# Patient Record
Sex: FEMALE | Race: BLACK OR AFRICAN AMERICAN | Employment: OTHER | URBAN - METROPOLITAN AREA
[De-identification: names, ages, dates, MRNs, and addresses within clinical notes are randomized per-mention and may not be internally consistent; named-entity substitution may affect disease eponyms.]

---

## 2020-01-15 ENCOUNTER — HOSPITAL ENCOUNTER (EMERGENCY)
Age: 80
Discharge: HOME | End: 2020-01-15
Attending: EMERGENCY MEDICINE
Payer: MEDICARE

## 2020-01-15 VITALS
TEMPERATURE: 98 F | HEART RATE: 66 BPM | RESPIRATION RATE: 13 BRPM | OXYGEN SATURATION: 98 % | SYSTOLIC BLOOD PRESSURE: 189 MMHG | HEIGHT: 64 IN | BODY MASS INDEX: 26.84 KG/M2 | WEIGHT: 157.19 LBS | DIASTOLIC BLOOD PRESSURE: 71 MMHG

## 2020-01-15 DIAGNOSIS — M19.011 PRIMARY OSTEOARTHRITIS OF RIGHT SHOULDER: Primary | ICD-10-CM

## 2020-01-15 PROCEDURE — 99283 EMERGENCY DEPT VISIT LOW MDM: CPT

## 2020-01-15 PROCEDURE — 93005 ELECTROCARDIOGRAM TRACING: CPT

## 2020-01-15 PROCEDURE — 74011636637 HC RX REV CODE- 636/637: Performed by: EMERGENCY MEDICINE

## 2020-01-15 PROCEDURE — 74011250636 HC RX REV CODE- 250/636: Performed by: EMERGENCY MEDICINE

## 2020-01-15 PROCEDURE — 96372 THER/PROPH/DIAG INJ SC/IM: CPT

## 2020-01-15 RX ORDER — KETOROLAC TROMETHAMINE 30 MG/ML
15 INJECTION, SOLUTION INTRAMUSCULAR; INTRAVENOUS ONCE
Status: COMPLETED | OUTPATIENT
Start: 2020-01-15 | End: 2020-01-15

## 2020-01-15 RX ORDER — PREDNISONE 20 MG/1
20 TABLET ORAL DAILY
Qty: 7 TAB | Refills: 0 | Status: SHIPPED | OUTPATIENT
Start: 2020-01-15 | End: 2020-01-22

## 2020-01-15 RX ORDER — PREDNISONE 20 MG/1
40 TABLET ORAL
Status: COMPLETED | OUTPATIENT
Start: 2020-01-15 | End: 2020-01-15

## 2020-01-15 RX ORDER — NAPROXEN 500 MG/1
500 TABLET ORAL 2 TIMES DAILY WITH MEALS
Qty: 20 TAB | Refills: 0 | Status: SHIPPED | OUTPATIENT
Start: 2020-01-15 | End: 2020-01-25

## 2020-01-15 RX ADMIN — KETOROLAC TROMETHAMINE 15 MG: 30 INJECTION, SOLUTION INTRAMUSCULAR at 16:47

## 2020-01-15 RX ADMIN — PREDNISONE 40 MG: 20 TABLET ORAL at 16:46

## 2020-01-15 NOTE — ED PROVIDER NOTES
EMERGENCY DEPARTMENT HISTORY AND PHYSICAL EXAM      Date: 1/15/2020  Patient Name: Zane Maldonado  Patient Age and Sex: 78 y.o. female    History of Presenting Illness     Chief Complaint   Patient presents with    Arm Pain     Ambulatory into the ED with c/o Rt sided neck/shoulder pain, worse with movement. Pain x 3 months. Also c/o decreased ROM to RUE. Pt also reports having to wear \"a heart monitor\" due to recent tachycardia. History Provided By: Patient    Ability to gather history was limited by: None    HPI: Zane Maldonado, 78 y.o. female complains of approximately 3 months of pain in the right shoulder and right neck, aching and cramping in nature, up to 8 out of 10 severity, and worsened by range of motion of the right shoulder and turning the neck. No falls or injuries. No numbness or weakness. No headaches. Reports she saw an orthopedist for the same symptoms about 3 months ago, was advised to start a course of physical therapy and have corticosteroid injection. She reports that she has been unable to get the steroid injection secondary to insurance issues, and unable to go to physical therapy secondary to financial issues with the co-pay and missing work. She has not taken any medications for the pain, as she states she cannot take ibuprofen or Naprosyn. Pt denies any other alleviating or exacerbating factors. There are no other complaints, changes or physical findings at this time.      Past Medical History:   Diagnosis Date    Asthma     Diabetes [facility])      Past Surgical History:   Procedure Laterality Date    BREAST SURGERY PROCEDURE UNLISTED      lumpectomy    HX HEENT      bilateral cataracts removed    HX OTHER SURGICAL      hemoroidectomy    UPPER GI ENDOSCOPY,BIOPSY  12/21/2016         UPPER GI ENDOSCOPY,REMV TUMOR,SNARE  12/21/2016            PCP: Art Renae MD    Past History     Past Medical History:  Past Medical History:   Diagnosis Date    Asthma     Diabetes [facility])        Past Surgical History:  Past Surgical History:   Procedure Laterality Date    BREAST SURGERY PROCEDURE UNLISTED      lumpectomy    HX HEENT      bilateral cataracts removed    HX OTHER SURGICAL      hemoroidectomy    UPPER GI ENDOSCOPY,BIOPSY  12/21/2016         UPPER GI ENDOSCOPY,REMV TUMOR,SNARE  12/21/2016            Family History:  No family history on file. Social History:  Social History     Tobacco Use    Smoking status: Never Smoker   Substance Use Topics    Alcohol use: Yes     Comment: occasional    Drug use: No       Allergies:  No Known Allergies    Current Medications:  No current facility-administered medications on file prior to encounter. Current Outpatient Medications on File Prior to Encounter   Medication Sig Dispense Refill    esomeprazole (NEXIUM) 40 mg capsule Take 40 mg by mouth daily.  traMADol (ULTRAM) 50 mg tablet Take 1 Tab by mouth every six (6) hours as needed for Pain. Max Daily Amount: 200 mg. 20 Tab 0    atenolol (TENORMIN) 25 mg tablet Take 25 mg by mouth daily.  glimepiride (AMARYL) 1 mg tablet Take 1 mg by mouth Daily (before breakfast).  ASPIRIN (ASPIR-81 PO) Take 81 mg by mouth.  lisinopril (PRINIVIL, ZESTRIL) 10 mg tablet Take  by mouth daily. Review of Systems   Review of Systems   Constitutional: Negative for fever. Musculoskeletal: Positive for neck pain and neck stiffness. Negative for gait problem. Neurological: Negative for headaches. All other systems reviewed and are negative. Physical Exam   Vital Signs  Patient Vitals for the past 24 hrs:   Temp Pulse Resp BP SpO2   01/15/20 1558 98 °F (36.7 °C) 66 13 189/71 98 %       Physical Exam  Vitals signs and nursing note reviewed. Constitutional:       General: She is not in acute distress. Appearance: She is well-developed. HENT:      Head: Normocephalic and atraumatic.       Mouth/Throat:      Mouth: Mucous membranes are moist.   Eyes: General:         Right eye: No discharge. Left eye: No discharge. Conjunctiva/sclera: Conjunctivae normal.   Neck:      Musculoskeletal: Normal range of motion and neck supple. Normal range of motion. Muscular tenderness present. No injury or spinous process tenderness. Cardiovascular:      Rate and Rhythm: Normal rate and regular rhythm. Heart sounds: Normal heart sounds. No murmur. Pulmonary:      Effort: Pulmonary effort is normal. No respiratory distress. Breath sounds: Normal breath sounds. No wheezing. Abdominal:      General: There is no distension. Palpations: Abdomen is soft. Tenderness: There is no tenderness. Musculoskeletal:         General: No deformity. Right shoulder: She exhibits decreased range of motion and tenderness. She exhibits no swelling and no deformity. Skin:     General: Skin is warm and dry. Findings: No rash. Neurological:      Mental Status: She is alert and oriented to person, place, and time. Psychiatric:         Behavior: Behavior normal.         Thought Content: Thought content normal.         Diagnostic Study Results   Labs  No results found for this or any previous visit (from the past 24 hour(s)). Radiologic Studies  No orders to display     CT Results  (Last 48 hours)    None        CXR Results  (Last 48 hours)    None          Procedures   Procedures    Medical Decision Making     Provider Notes (Medical Decision Making):   42-year-old female with chronic musculoskeletal type pain in the right shoulder and right neck, which was previously evaluated by an orthopedist in October 2019 and deemed to be uncomplicated osteoarthritis. Patient has been unable to obtain the recommended course of therapy that included steroid injection and physical therapy. Her examination today is reassuring. She seems mildly uncomfortable, especially with range of motion of the right shoulder. There are no signs of septic arthritis. No erythema or induration. She has uncomplicated muscular tenderness in the right shoulder and right trapezius area. No significant concern for spinal cord pathology, septic arthritis, or fracture. There is no indication for repeat x-ray or CT imaging today, given her recent x-rays demonstrating osteoarthritis. Give a course of oral prednisone, will also give a course of 10 days of naproxen. As far as I can tell should be safe for her to take a short course of naproxen. She will follow-up with the orthopedist for steroid injections, follow-up with primary care physician to help arrange physical therapy. Paddy Wade MD  4:40 PM        Consult required? No      Medications Administered During ED Course:  Medications   ketorolac (TORADOL) injection 15 mg (has no administration in time range)   predniSONE (DELTASONE) tablet 40 mg (has no administration in time range)          Diagnosis and Disposition     Disposition:  Discharged    Clinical Impression:   1. Primary osteoarthritis of right shoulder        Attestation:  I personally performed the services described in this documentation on this date 1/15/2020 for patient Rizwana Acosta. Paddy Wade MD        I was the first provider for this patient on this visit. To the best of my ability I reviewed relevant prior medical records, electrocardiograms, laboratories, and radiologic studies. The patient's presenting problems were discussed, and the patient was in agreement with the care plan formulated and outlined with them. Paddy Wade MD    Please note that this dictation was completed with Dragon voice recognition software. Quite often unanticipated grammatical, syntax, homophones, and other interpretive errors are inadvertently transcribed by the computer software. Please disregard these errors and excuse any errors that have escaped final proofreading.

## 2020-01-15 NOTE — ED NOTES
Pt discharged by MD. Pt provided with discharge instructions Rx and instructions on follow up care. Pt out of ED ambulatory without difficulty accompanied by family.

## 2020-01-15 NOTE — ED NOTES
Pt reports from home with family bedside. Pt has worsening 8/10  arm/neck pain on the right side for several months. Pt reports that she feels grinding sensation in affected area that is reducing her ROM. Pt is monitored X2.

## 2020-01-15 NOTE — DISCHARGE INSTRUCTIONS
Take prednisone 20 mg daily for 1 week. Also take naproxen 500 mg twice daily for 10 days. Follow-up with Dr. Nimisha Peterson, orthopedist, for steroid injections, and arrange physical therapy through your primary care physician.

## 2020-01-16 LAB
ATRIAL RATE: 67 BPM
CALCULATED P AXIS, ECG09: 31 DEGREES
CALCULATED R AXIS, ECG10: -12 DEGREES
CALCULATED T AXIS, ECG11: 16 DEGREES
DIAGNOSIS, 93000: NORMAL
P-R INTERVAL, ECG05: 126 MS
Q-T INTERVAL, ECG07: 396 MS
QRS DURATION, ECG06: 86 MS
QTC CALCULATION (BEZET), ECG08: 418 MS
VENTRICULAR RATE, ECG03: 67 BPM

## 2020-09-22 ENCOUNTER — APPOINTMENT (OUTPATIENT)
Dept: GENERAL RADIOLOGY | Age: 80
DRG: 287 | End: 2020-09-22
Attending: EMERGENCY MEDICINE
Payer: MEDICARE

## 2020-09-22 ENCOUNTER — HOSPITAL ENCOUNTER (INPATIENT)
Age: 80
LOS: 3 days | Discharge: HOME HEALTH | DRG: 287 | End: 2020-09-25
Attending: EMERGENCY MEDICINE | Admitting: STUDENT IN AN ORGANIZED HEALTH CARE EDUCATION/TRAINING PROGRAM
Payer: MEDICARE

## 2020-09-22 ENCOUNTER — APPOINTMENT (OUTPATIENT)
Dept: CT IMAGING | Age: 80
DRG: 287 | End: 2020-09-22
Attending: EMERGENCY MEDICINE
Payer: MEDICARE

## 2020-09-22 DIAGNOSIS — R91.1 LUNG NODULE: ICD-10-CM

## 2020-09-22 DIAGNOSIS — R77.8 TROPONIN LEVEL ELEVATED: ICD-10-CM

## 2020-09-22 DIAGNOSIS — I50.9 ACUTE CONGESTIVE HEART FAILURE, UNSPECIFIED HEART FAILURE TYPE (HCC): ICD-10-CM

## 2020-09-22 DIAGNOSIS — E87.70 HYPERVOLEMIA, UNSPECIFIED HYPERVOLEMIA TYPE: Primary | ICD-10-CM

## 2020-09-22 PROBLEM — D61.818 PANCYTOPENIA (HCC): Status: ACTIVE | Noted: 2020-09-22

## 2020-09-22 PROBLEM — C90.00 MULTIPLE MYELOMA (HCC): Status: ACTIVE | Noted: 2020-09-22

## 2020-09-22 PROBLEM — E11.9 TYPE II DIABETES MELLITUS (HCC): Status: ACTIVE | Noted: 2020-09-22

## 2020-09-22 LAB
ALBUMIN SERPL-MCNC: 3.2 G/DL (ref 3.5–5)
ALBUMIN/GLOB SERPL: 1.2 {RATIO} (ref 1.1–2.2)
ALP SERPL-CCNC: 84 U/L (ref 45–117)
ALT SERPL-CCNC: 23 U/L (ref 12–78)
ANION GAP SERPL CALC-SCNC: 5 MMOL/L (ref 5–15)
AST SERPL-CCNC: 22 U/L (ref 15–37)
ATRIAL RATE: 103 BPM
BASOPHILS # BLD: 0 K/UL (ref 0–0.1)
BASOPHILS NFR BLD: 0 % (ref 0–1)
BILIRUB SERPL-MCNC: 0.8 MG/DL (ref 0.2–1)
BNP SERPL-MCNC: 2108 PG/ML
BUN SERPL-MCNC: 9 MG/DL (ref 6–20)
BUN/CREAT SERPL: 11 (ref 12–20)
CALCIUM SERPL-MCNC: 7.9 MG/DL (ref 8.5–10.1)
CALCULATED P AXIS, ECG09: 41 DEGREES
CALCULATED R AXIS, ECG10: -17 DEGREES
CALCULATED T AXIS, ECG11: -178 DEGREES
CHLORIDE SERPL-SCNC: 112 MMOL/L (ref 97–108)
CK SERPL-CCNC: 50 U/L (ref 26–192)
CO2 SERPL-SCNC: 28 MMOL/L (ref 21–32)
CREAT SERPL-MCNC: 0.8 MG/DL (ref 0.55–1.02)
DIAGNOSIS, 93000: NORMAL
DIFFERENTIAL METHOD BLD: ABNORMAL
EOSINOPHIL # BLD: 0 K/UL (ref 0–0.4)
EOSINOPHIL NFR BLD: 1 % (ref 0–7)
ERYTHROCYTE [DISTWIDTH] IN BLOOD BY AUTOMATED COUNT: 19.1 % (ref 11.5–14.5)
GLOBULIN SER CALC-MCNC: 2.7 G/DL (ref 2–4)
GLUCOSE BLD STRIP.AUTO-MCNC: 118 MG/DL (ref 65–100)
GLUCOSE BLD STRIP.AUTO-MCNC: 85 MG/DL (ref 65–100)
GLUCOSE SERPL-MCNC: 145 MG/DL (ref 65–100)
HCT VFR BLD AUTO: 31.5 % (ref 35–47)
HGB BLD-MCNC: 10.3 G/DL (ref 11.5–16)
IMM GRANULOCYTES # BLD AUTO: 0 K/UL (ref 0–0.04)
IMM GRANULOCYTES NFR BLD AUTO: 0 % (ref 0–0.5)
LYMPHOCYTES # BLD: 0.6 K/UL (ref 0.8–3.5)
LYMPHOCYTES NFR BLD: 18 % (ref 12–49)
MCH RBC QN AUTO: 26.1 PG (ref 26–34)
MCHC RBC AUTO-ENTMCNC: 32.7 G/DL (ref 30–36.5)
MCV RBC AUTO: 79.7 FL (ref 80–99)
MONOCYTES # BLD: 0.7 K/UL (ref 0–1)
MONOCYTES NFR BLD: 21 % (ref 5–13)
NEUTS SEG # BLD: 2.2 K/UL (ref 1.8–8)
NEUTS SEG NFR BLD: 60 % (ref 32–75)
NRBC # BLD: 0 K/UL (ref 0–0.01)
NRBC BLD-RTO: 0 PER 100 WBC
P-R INTERVAL, ECG05: 128 MS
PLATELET # BLD AUTO: 77 K/UL (ref 150–400)
PMV BLD AUTO: ABNORMAL FL (ref 8.9–12.9)
POTASSIUM SERPL-SCNC: 3.6 MMOL/L (ref 3.5–5.1)
PROT SERPL-MCNC: 5.9 G/DL (ref 6.4–8.2)
Q-T INTERVAL, ECG07: 344 MS
QRS DURATION, ECG06: 84 MS
QTC CALCULATION (BEZET), ECG08: 450 MS
RBC # BLD AUTO: 3.95 M/UL (ref 3.8–5.2)
RBC MORPH BLD: ABNORMAL
SERVICE CMNT-IMP: ABNORMAL
SERVICE CMNT-IMP: NORMAL
SODIUM SERPL-SCNC: 145 MMOL/L (ref 136–145)
TROPONIN I SERPL-MCNC: 0.11 NG/ML
TROPONIN I SERPL-MCNC: 0.12 NG/ML
TROPONIN I SERPL-MCNC: 0.13 NG/ML
TSH SERPL DL<=0.05 MIU/L-ACNC: 0.97 UIU/ML (ref 0.36–3.74)
VENTRICULAR RATE, ECG03: 103 BPM
WBC # BLD AUTO: 3.5 K/UL (ref 3.6–11)

## 2020-09-22 PROCEDURE — 85025 COMPLETE CBC W/AUTO DIFF WBC: CPT

## 2020-09-22 PROCEDURE — 71275 CT ANGIOGRAPHY CHEST: CPT

## 2020-09-22 PROCEDURE — 80053 COMPREHEN METABOLIC PANEL: CPT

## 2020-09-22 PROCEDURE — 93005 ELECTROCARDIOGRAM TRACING: CPT

## 2020-09-22 PROCEDURE — 65660000000 HC RM CCU STEPDOWN

## 2020-09-22 PROCEDURE — 94761 N-INVAS EAR/PLS OXIMETRY MLT: CPT

## 2020-09-22 PROCEDURE — 71045 X-RAY EXAM CHEST 1 VIEW: CPT

## 2020-09-22 PROCEDURE — 74011250636 HC RX REV CODE- 250/636: Performed by: STUDENT IN AN ORGANIZED HEALTH CARE EDUCATION/TRAINING PROGRAM

## 2020-09-22 PROCEDURE — 83880 ASSAY OF NATRIURETIC PEPTIDE: CPT

## 2020-09-22 PROCEDURE — 74011250637 HC RX REV CODE- 250/637: Performed by: EMERGENCY MEDICINE

## 2020-09-22 PROCEDURE — 84484 ASSAY OF TROPONIN QUANT: CPT

## 2020-09-22 PROCEDURE — 36415 COLL VENOUS BLD VENIPUNCTURE: CPT

## 2020-09-22 PROCEDURE — 74011250637 HC RX REV CODE- 250/637: Performed by: STUDENT IN AN ORGANIZED HEALTH CARE EDUCATION/TRAINING PROGRAM

## 2020-09-22 PROCEDURE — 74011000636 HC RX REV CODE- 636: Performed by: EMERGENCY MEDICINE

## 2020-09-22 PROCEDURE — 74011250636 HC RX REV CODE- 250/636: Performed by: EMERGENCY MEDICINE

## 2020-09-22 PROCEDURE — 99284 EMERGENCY DEPT VISIT MOD MDM: CPT

## 2020-09-22 PROCEDURE — 96374 THER/PROPH/DIAG INJ IV PUSH: CPT

## 2020-09-22 PROCEDURE — 82550 ASSAY OF CK (CPK): CPT

## 2020-09-22 PROCEDURE — 82962 GLUCOSE BLOOD TEST: CPT

## 2020-09-22 PROCEDURE — 84443 ASSAY THYROID STIM HORMONE: CPT

## 2020-09-22 RX ORDER — CARVEDILOL 12.5 MG/1
12.5 TABLET ORAL 2 TIMES DAILY WITH MEALS
Status: DISCONTINUED | OUTPATIENT
Start: 2020-09-22 | End: 2020-09-22

## 2020-09-22 RX ORDER — ACETAMINOPHEN 325 MG/1
650 TABLET ORAL
Status: DISCONTINUED | OUTPATIENT
Start: 2020-09-22 | End: 2020-09-25 | Stop reason: HOSPADM

## 2020-09-22 RX ORDER — LABETALOL HYDROCHLORIDE 5 MG/ML
20 INJECTION, SOLUTION INTRAVENOUS
Status: DISCONTINUED | OUTPATIENT
Start: 2020-09-22 | End: 2020-09-25 | Stop reason: HOSPADM

## 2020-09-22 RX ORDER — DEXAMETHASONE 4 MG/1
40 TABLET ORAL
COMMUNITY

## 2020-09-22 RX ORDER — MAGNESIUM SULFATE 100 %
4 CRYSTALS MISCELLANEOUS AS NEEDED
Status: DISCONTINUED | OUTPATIENT
Start: 2020-09-22 | End: 2020-09-25 | Stop reason: HOSPADM

## 2020-09-22 RX ORDER — FUROSEMIDE 10 MG/ML
40 INJECTION INTRAMUSCULAR; INTRAVENOUS 2 TIMES DAILY
Status: DISCONTINUED | OUTPATIENT
Start: 2020-09-22 | End: 2020-09-25

## 2020-09-22 RX ORDER — SODIUM CHLORIDE 0.9 % (FLUSH) 0.9 %
10 SYRINGE (ML) INJECTION
Status: COMPLETED | OUTPATIENT
Start: 2020-09-22 | End: 2020-09-22

## 2020-09-22 RX ORDER — ASPIRIN 325 MG
325 TABLET ORAL
Status: COMPLETED | OUTPATIENT
Start: 2020-09-22 | End: 2020-09-22

## 2020-09-22 RX ORDER — DOCUSATE SODIUM 100 MG/1
100 CAPSULE, LIQUID FILLED ORAL AS NEEDED
Status: DISCONTINUED | OUTPATIENT
Start: 2020-09-22 | End: 2020-09-25 | Stop reason: HOSPADM

## 2020-09-22 RX ORDER — INSULIN LISPRO 100 [IU]/ML
INJECTION, SOLUTION INTRAVENOUS; SUBCUTANEOUS
Status: DISCONTINUED | OUTPATIENT
Start: 2020-09-22 | End: 2020-09-25 | Stop reason: HOSPADM

## 2020-09-22 RX ORDER — ACYCLOVIR 800 MG/1
400 TABLET ORAL 2 TIMES DAILY
Status: DISCONTINUED | OUTPATIENT
Start: 2020-09-22 | End: 2020-09-23

## 2020-09-22 RX ORDER — DEXTROSE 50 % IN WATER (D50W) INTRAVENOUS SYRINGE
12.5-25 AS NEEDED
Status: DISCONTINUED | OUTPATIENT
Start: 2020-09-22 | End: 2020-09-25 | Stop reason: HOSPADM

## 2020-09-22 RX ORDER — ENOXAPARIN SODIUM 100 MG/ML
40 INJECTION SUBCUTANEOUS EVERY 24 HOURS
Status: DISCONTINUED | OUTPATIENT
Start: 2020-09-22 | End: 2020-09-25 | Stop reason: HOSPADM

## 2020-09-22 RX ORDER — CARVEDILOL 12.5 MG/1
25 TABLET ORAL 2 TIMES DAILY WITH MEALS
Status: DISCONTINUED | OUTPATIENT
Start: 2020-09-22 | End: 2020-09-25 | Stop reason: HOSPADM

## 2020-09-22 RX ORDER — ACYCLOVIR 400 MG/1
400 TABLET ORAL 2 TIMES DAILY
COMMUNITY

## 2020-09-22 RX ORDER — ONDANSETRON 2 MG/ML
4 INJECTION INTRAMUSCULAR; INTRAVENOUS
Status: DISCONTINUED | OUTPATIENT
Start: 2020-09-22 | End: 2020-09-25 | Stop reason: HOSPADM

## 2020-09-22 RX ORDER — CYANOCOBALAMIN (VITAMIN B-12) 1000 MCG
1 TABLET, EXTENDED RELEASE ORAL DAILY
COMMUNITY
End: 2020-09-25

## 2020-09-22 RX ORDER — CARVEDILOL 12.5 MG/1
12.5 TABLET ORAL 2 TIMES DAILY WITH MEALS
COMMUNITY
End: 2020-09-25

## 2020-09-22 RX ORDER — FUROSEMIDE 10 MG/ML
20 INJECTION INTRAMUSCULAR; INTRAVENOUS
Status: COMPLETED | OUTPATIENT
Start: 2020-09-22 | End: 2020-09-22

## 2020-09-22 RX ORDER — LANOLIN ALCOHOL/MO/W.PET/CERES
1 CREAM (GRAM) TOPICAL
Status: DISCONTINUED | OUTPATIENT
Start: 2020-09-23 | End: 2020-09-25 | Stop reason: HOSPADM

## 2020-09-22 RX ADMIN — ACYCLOVIR 400 MG: 800 TABLET ORAL at 18:07

## 2020-09-22 RX ADMIN — CARVEDILOL 25 MG: 12.5 TABLET, FILM COATED ORAL at 18:07

## 2020-09-22 RX ADMIN — ACYCLOVIR 400 MG: 800 TABLET ORAL at 12:10

## 2020-09-22 RX ADMIN — FUROSEMIDE 20 MG: 10 INJECTION, SOLUTION INTRAMUSCULAR; INTRAVENOUS at 08:38

## 2020-09-22 RX ADMIN — IOPAMIDOL 57 ML: 755 INJECTION, SOLUTION INTRAVENOUS at 09:58

## 2020-09-22 RX ADMIN — FUROSEMIDE 40 MG: 10 INJECTION, SOLUTION INTRAMUSCULAR; INTRAVENOUS at 18:07

## 2020-09-22 RX ADMIN — ENOXAPARIN SODIUM 40 MG: 40 INJECTION SUBCUTANEOUS at 12:10

## 2020-09-22 RX ADMIN — Medication 10 ML: at 09:58

## 2020-09-22 RX ADMIN — ASPIRIN 325 MG: 325 TABLET, FILM COATED ORAL at 09:41

## 2020-09-22 RX ADMIN — CARVEDILOL 25 MG: 12.5 TABLET, FILM COATED ORAL at 12:10

## 2020-09-22 NOTE — ED PROVIDER NOTES
EMERGENCY DEPARTMENT HISTORY AND PHYSICAL EXAM      Date: 9/22/2020  Patient Name: Lisandro Ramsay    History of Presenting Illness     Chief Complaint   Patient presents with    Shortness of Breath       History Provided By: Patient    HPI: Lisandro Ramsay, 78 y.o. female presents to the ED with cc of shortness of breath. Patient reports intermittent shortness of breath on and off for \"some time. \"  Patient states that she thinks she has a history of asthma. Today, she presents emergency department due to shortness of breath that is more severe. Patient reports she is winded with ambulation and at rest.  Patient states she has been unable to get comfortable at night. She reports orthopnea. Denies cough or hemoptysis. Denies fevers or chills. Denies any sick contacts. Denies new chest pain, although did have herpes zoster has some residual pain from that. He is undergoing chemotherapy, last chemotherapy 1 week ago. Does report increased lower extremity swelling bilaterally since starting chemotherapy. No history of CHF. No history of DVT or PE. There are no other complaints, changes, or physical findings at this time. PCP: Soto Jules MD    No current facility-administered medications on file prior to encounter. Current Outpatient Medications on File Prior to Encounter   Medication Sig Dispense Refill    carvediloL (COREG) 12.5 mg tablet Take 12.5 mg by mouth two (2) times daily (with meals).  dexAMETHasone (DECADRON) 4 mg tablet Take 40 mg by mouth every seven (7) days. Takes before chemo for MM      acyclovir (ZOVIRAX) 400 mg tablet Take 400 mg by mouth two (2) times a day.  iron, carbonyl (Feosol) 45 mg tab Take 1 Tab by mouth daily.  glimepiride (AMARYL) 1 mg tablet Take 1 mg by mouth Daily (before breakfast).  [DISCONTINUED] esomeprazole (NEXIUM) 40 mg capsule Take 40 mg by mouth daily.       [DISCONTINUED] traMADol (ULTRAM) 50 mg tablet Take 1 Tab by mouth every six (6) hours as needed for Pain. Max Daily Amount: 200 mg. 20 Tab 0    ASPIRIN (ASPIR-81 PO) Take 81 mg by mouth.  [DISCONTINUED] atenolol (TENORMIN) 25 mg tablet Take 25 mg by mouth daily.  [DISCONTINUED] lisinopril (PRINIVIL, ZESTRIL) 10 mg tablet Take  by mouth daily. Past History     Past Medical History:  Past Medical History:   Diagnosis Date    Asthma     Coronary artery disease     s/p stent    Diabetes ([facility] [address]     20+ years    Hypertension     Multiple myeloma ([facility] [address] 2020       Past Surgical History:  Past Surgical History:   Procedure Laterality Date    BREAST SURGERY PROCEDURE UNLISTED      lumpectomy    HX HEENT      bilateral cataracts removed    HX OTHER SURGICAL      hemoroidectomy    UPPER GI ENDOSCOPY,BIOPSY  12/21/2016         UPPER GI ENDOSCOPY,REMV TUMOR,SNARE  12/21/2016            Family History:  History reviewed. No pertinent family history. Social History:  Social History     Tobacco Use    Smoking status: Never Smoker    Smokeless tobacco: Never Used   Substance Use Topics    Alcohol use: Not Currently     Comment: occasional    Drug use: No       Allergies:  No Known Allergies      Review of Systems   Review of Systems   Constitutional: Negative for activity change, chills and fever. HENT: Negative for facial swelling and voice change. Eyes: Negative for redness. Respiratory: Positive for shortness of breath. Negative for cough and wheezing. Cardiovascular: Positive for chest pain and leg swelling. Gastrointestinal: Negative for abdominal pain, diarrhea, nausea and vomiting. Genitourinary: Negative for decreased urine volume. Musculoskeletal: Negative for gait problem. Skin: Negative for pallor and rash. Neurological: Negative for tremors and facial asymmetry. Psychiatric/Behavioral: Negative for agitation. All other systems reviewed and are negative. Physical Exam   Physical Exam  Vitals signs and nursing note reviewed. Constitutional:       Comments: 78-year-old female, becomes dyspneic with ambulation to bed from wheelchair. HENT:      Head: Normocephalic and atraumatic. Neck:      Musculoskeletal: Normal range of motion. Cardiovascular:      Rate and Rhythm: Regular rhythm. Tachycardia present. Heart sounds: No murmur. No friction rub. No gallop. Pulmonary:      Effort: Tachypnea present. Breath sounds: Normal breath sounds. Chest:      Chest wall: Tenderness present. Abdominal:      Palpations: Abdomen is soft. Tenderness: There is no abdominal tenderness. Musculoskeletal: Normal range of motion. Right lower leg: Edema present. Left lower leg: Edema present. Comments: 3+ pitting edema bilaterally. Skin:     General: Skin is warm. Capillary Refill: Capillary refill takes less than 2 seconds. Neurological:      General: No focal deficit present. Mental Status: She is alert. Psychiatric:         Mood and Affect: Mood normal.         Diagnostic Study Results     Labs -     Recent Results (from the past 12 hour(s))   EKG, 12 LEAD, INITIAL    Collection Time: 09/22/20  7:36 AM   Result Value Ref Range    Ventricular Rate 103 BPM    Atrial Rate 103 BPM    P-R Interval 128 ms    QRS Duration 84 ms    Q-T Interval 344 ms    QTC Calculation (Bezet) 450 ms    Calculated P Axis 41 degrees    Calculated R Axis -17 degrees    Calculated T Axis -178 degrees    Diagnosis       Sinus tachycardia with premature supraventricular complexes  Minimal voltage criteria for LVH, may be normal variant  ST & T wave abnormality, consider lateral ischemia  When compared with ECG of 15-ROBERT-2020 16:02,  premature supraventricular complexes are now present  Vent.  rate has increased BY  36 BPM  ST now depressed in Lateral leads  Nonspecific T wave abnormality, worse in Inferior leads  T wave inversion now evident in Lateral leads     CBC WITH AUTOMATED DIFF    Collection Time: 09/22/20  8:31 AM Result Value Ref Range    WBC 3.5 (L) 3.6 - 11.0 K/uL    RBC 3.95 3.80 - 5.20 M/uL    HGB 10.3 (L) 11.5 - 16.0 g/dL    HCT 31.5 (L) 35.0 - 47.0 %    MCV 79.7 (L) 80.0 - 99.0 FL    MCH 26.1 26.0 - 34.0 PG    MCHC 32.7 30.0 - 36.5 g/dL    RDW 19.1 (H) 11.5 - 14.5 %    PLATELET 77 (L) 599 - 400 K/uL    MPV ABNORMAL 8.9 - 12.9 FL    NRBC 0.0 0  WBC    ABSOLUTE NRBC 0.00 0.00 - 0.01 K/uL    NEUTROPHILS 60 32 - 75 %    LYMPHOCYTES 18 12 - 49 %    MONOCYTES 21 (H) 5 - 13 %    EOSINOPHILS 1 0 - 7 %    BASOPHILS 0 0 - 1 %    IMMATURE GRANULOCYTES 0 0.0 - 0.5 %    ABS. NEUTROPHILS 2.2 1.8 - 8.0 K/UL    ABS. LYMPHOCYTES 0.6 (L) 0.8 - 3.5 K/UL    ABS. MONOCYTES 0.7 0.0 - 1.0 K/UL    ABS. EOSINOPHILS 0.0 0.0 - 0.4 K/UL    ABS. BASOPHILS 0.0 0.0 - 0.1 K/UL    ABS. IMM. GRANS. 0.0 0.00 - 0.04 K/UL    DF SMEAR SCANNED      RBC COMMENTS ANISOCYTOSIS  1+       METABOLIC PANEL, COMPREHENSIVE    Collection Time: 09/22/20  8:31 AM   Result Value Ref Range    Sodium 145 136 - 145 mmol/L    Potassium 3.6 3.5 - 5.1 mmol/L    Chloride 112 (H) 97 - 108 mmol/L    CO2 28 21 - 32 mmol/L    Anion gap 5 5 - 15 mmol/L    Glucose 145 (H) 65 - 100 mg/dL    BUN 9 6 - 20 MG/DL    Creatinine 0.80 0.55 - 1.02 MG/DL    BUN/Creatinine ratio 11 (L) 12 - 20      GFR est AA >60 >60 ml/min/1.73m2    GFR est non-AA >60 >60 ml/min/1.73m2    Calcium 7.9 (L) 8.5 - 10.1 MG/DL    Bilirubin, total 0.8 0.2 - 1.0 MG/DL    ALT (SGPT) 23 12 - 78 U/L    AST (SGOT) 22 15 - 37 U/L    Alk.  phosphatase 84 45 - 117 U/L    Protein, total 5.9 (L) 6.4 - 8.2 g/dL    Albumin 3.2 (L) 3.5 - 5.0 g/dL    Globulin 2.7 2.0 - 4.0 g/dL    A-G Ratio 1.2 1.1 - 2.2     CK W/ REFLX CKMB    Collection Time: 09/22/20  8:31 AM   Result Value Ref Range    CK 50 26 - 192 U/L   TROPONIN I    Collection Time: 09/22/20  8:31 AM   Result Value Ref Range    Troponin-I, Qt. 0.11 (H) <0.05 ng/mL   NT-PRO BNP    Collection Time: 09/22/20  8:31 AM   Result Value Ref Range    NT pro-BNP 2,108 (H) <450 PG/ML   TROPONIN I    Collection Time: 09/22/20 10:36 AM   Result Value Ref Range    Troponin-I, Qt. 0.13 (H) <0.05 ng/mL   TSH 3RD GENERATION    Collection Time: 09/22/20 10:36 AM   Result Value Ref Range    TSH 0.97 0.36 - 3.74 uIU/mL       Radiologic Studies -   CTA CHEST W OR W WO CONT   Final Result   IMPRESSION:      1. No evidence for pulmonary emboli. 2. Bilateral pleural effusions and pulmonary edema are noted. 3. 2 x 1.6 cm focal consolidation anteriorly in the right upper lobe could be   foci of pneumonia either bacterial or viral. This could be neoplastic process. Clinical correlation close follow-up is suggested. This is part solid in   appearance. Follow-up CT in 3 months is suggested. 4. Fairly extensive changes of multiple myeloma most pronounced in the manubrium   but there are several lytic areas as described. XR CHEST PORT   Final Result   Impression: Pulmonary edema. CT Results  (Last 48 hours)               09/22/20 0958  CTA CHEST W OR W WO CONT Final result    Impression:  IMPRESSION:       1. No evidence for pulmonary emboli. 2. Bilateral pleural effusions and pulmonary edema are noted. 3. 2 x 1.6 cm focal consolidation anteriorly in the right upper lobe could be   foci of pneumonia either bacterial or viral. This could be neoplastic process. Clinical correlation close follow-up is suggested. This is part solid in   appearance. Follow-up CT in 3 months is suggested. 4. Fairly extensive changes of multiple myeloma most pronounced in the manubrium   but there are several lytic areas as described. Narrative:  INDICATION: History of melanoma, shortness of breath, evaluate for pulmonary   embolism       COMPARISON: 9/22/2020 chest radiograph       CONTRAST: 57 mL of Isovue-370. TECHNIQUE:  A precontrast localizer was utilized to determine the level of the   pulmonary arteries.  2.5 mm axial thin sections were obtained following the rapid bolus administration of 57 cc Isovue-370. Images were reformatted in the   sagittal and coronal plane. 3D post processing was performed including MIP   imaging. CT dose reduction was achieved through use of a standardized protocol   tailored for this examination and automatic exposure control for dose   modulation. FINDINGS:       THYROID: No nodule. MEDIASTINUM: No mass or lymphadenopathy. SHAUNA: No mass or lymphadenopathy. THORACIC AORTA: No aneurysm. MAIN PULMONARY ARTERY: Normal in caliber. There is excellent opacification of   the pulmonary arteries. No evidence for pulmonary emboli. TRACHEA/BRONCHI: Patent. ESOPHAGUS: No wall thickening or dilatation. HEART: Normal in size. PLEURA: There are moderate bilateral pleural effusions. These layer   dependently. .   The lungs demonstrate mild bilateral lower lobe areas of atelectasis due to the   effusions. There is hazy diffuse groundglass opacities with mild thickening of   the interlobular septa consistent pulmonary edema. Anteriorly in the right upper lobe best seen on image 93 is a 2.5 a 1.6 cm area   of apparent airspace consolidation. This could represent focal pneumonia either   bacterial or viral. Neoplastic process is not excluded. Clinical correlation and   close follow-up is suggested. Follow-up CT in 3 months is suggested. No other   such areas of apparent consolidation are noted. The central airways are patent       INCIDENTALLY IMAGED UPPER ABDOMEN: No focal abnormality. BONES: The bones are diffusely abnormal. There is a large lytic lesion involving   the entirety of the manubrium with some erosion of the cortex both anteriorly   and posteriorly. There are also lucent areas in the sternum. There are several   lytic mildly expansile rib lesions with associated fractures that appears   subacute these findings are consistent with multiple myeloma. No compression   fractures of the thoracic spine are noted. .       There are postsurgical changes in the left breast.               CXR Results  (Last 48 hours)               09/22/20 0801  XR CHEST PORT Final result    Impression:  Impression: Pulmonary edema. Narrative: Indication: Shortness of breath       Comparison: None       Portable exam of the chest obtained at 759 demonstrates mild cardiomegaly. Pulmonary edema is noted. Degenerative changes are seen in the glenohumeral   joints with bilateral chronic rotator cuff tears. Degenerative changes are also   noted in the thoracic spine. Medical Decision Making   I am the first provider for this patient. I reviewed the vital signs, available nursing notes, past medical history, past surgical history, family history and social history. Vital Signs-Reviewed the patient's vital signs. Patient Vitals for the past 12 hrs:   Temp Pulse Resp BP SpO2   09/22/20 1730  81 20 (!) 153/72 95 %   09/22/20 1700  80 22 (!) 162/68 94 %   09/22/20 1630  74 19 (!) 147/104 93 %   09/22/20 1600  72 21 (!) 165/73 95 %   09/22/20 1530  74 19 (!) 164/71 96 %   09/22/20 1500  76 18 (!) 160/69 95 %   09/22/20 1400  71 22 (!) 145/65 94 %   09/22/20 1215  75 29 (!) 154/87 95 %   09/22/20 1115  85 25 (!) 182/84 97 %   09/22/20 0838  82  (!) 178/89    09/22/20 0831     94 %   09/22/20 0738 98.2 °F (36.8 °C) (!) 104 (!) 32 (!) 200/106 93 %       EKG interpretation: (Preliminary)    Preliminary EKG interpreted by me. Shows sinus tachycardia with a HR of 103. No ST elevations, T wave inversions in the lateral precordial leads and T wave flattening. Records Reviewed: Nursing Notes and Old Medical Records    Provider Notes (Medical Decision Making):     78-year-old female presents emergency department with a chief plan of shortness of breath. Vital signs notable for tachypnea and increased respiratory effort.   Differential includes asthma exacerbation, although lung sounds are clear, CHF given lower extremity edema, PE.  Doubt ACS, patient's chest pain appears to be related to her prior of the zoster. EKG does not show STEMI. Doubt infectious causes. Basic labs. Chest x-ray. CTA PE. Consider eval for pericardial effusion given h/o melanoma. ED Course:   Initial assessment performed. The patients presenting problems have been discussed, and they are in agreement with the care plan formulated and outlined with them. I have encouraged them to ask questions as they arise throughout their visit. ED Course as of Sep 22 1750   Tue Sep 22, 2020   0937 Troponin mildly elevated 0.11, I will repeat and give ASA, cardiology consult placed. [MB]   56 CTA with no PE, multiple myeloma changes noted with lung nodule requiring follow-up. Discussed with Dr. Kaylen Rodríguez at bedside, diuresis and echo no heparin gtt at this time. [MB]      ED Course User Index  [MB] John Cooper MD     Patient and family initially stated \"melanoma,\" however based on CT appears patient's malignancy is multiple myeloma. Vee Beckford MD      Disposition:    Admitted      Diagnosis     Clinical Impression:   1. Hypervolemia, unspecified hypervolemia type    2. Troponin level elevated    3. Lung nodule        Attestations:    Vee Beckford MD    Please note that this dictation was completed with [employer], the computer voice recognition software. Quite often unanticipated grammatical, syntax, homophones, and other interpretive errors are inadvertently transcribed by the computer software. Please disregard these errors. Please excuse any errors that have escaped final proofreading. Thank you.

## 2020-09-22 NOTE — ED TRIAGE NOTES
Assumed care of pt from triage. Pt is A&O x 4. Pt reports CC of shortness of breath that has been going on for a few days. Pt says last night and this morning pt shortness of breath has become worse. Pt daughter at bedside. Dr. Demian Spann at the room on arrival. EKG done. Pt resting on stretcher in POC with call bell in reach. Pt placed on monitor x 3. VSS at this time. 3438: Medicated pt per orders. Pt placed on purwick. Pt resting on stretcher in POC with call bell in reach. Pt remains on monitor x 3. VSS at this time. 8636: Medicated pt per orders. 1121: Bedside and Verbal shift change report given to Ronit (oncoming nurse) by Tiffany Carreno (offgoing nurse). Report included the following information SBAR, ED Summary and Recent Results.

## 2020-09-22 NOTE — ED NOTES
Bedside and Verbal shift change report given to Kristin Mejia RN  (oncoming nurse) by Connye Saint, RN  (offgoing nurse). Report included the following information SBAR, Kardex, ED Summary, Intake/Output, MAR, Recent Results and Med Rec Status.

## 2020-09-22 NOTE — H&P
Hospitalist Admission Note    NAME: Jackie Chaney   :  1940   MRN:  [id number]     Date/Time:  2020 11:41 AM    Patient PCP: Jerry Marinelli MD  ______________________________________________________________________  Given the patient's current clinical presentation, I have a high level of concern for decompensation if discharged from the emergency department. Complex decision making was performed, which includes reviewing the patient's available past medical records, laboratory results, and x-ray films. My assessment of this patient's clinical condition and my plan of care is as follows. Assessment / Plan:  # Acute exacerbation of CHF  - newly diagnosed, no hx of CHF. Possibly related to bortezomib vs secondary to longstanding HTN. - presents with dyspnea, orthopnea, leg swelling  - pro-BNP   - CXR/CT shows pulmonary edema. CTA neg for PE. Plan  Diuress with IV lasix 40mg BID  Monitor I&O, electrolytes, daily weight measuring  Follow up echo result. Cardiology on board. # Pulmonary edema  - secondary to above    # elevated trop  - likely demand ischemia from uncontrolled HTN, tachycardia  Plan  Get EKG  Recycle troponin. # Hypertension - uncontrolled  - has hx of HTN and on coreg 12.5 BID at home  Plan  Increase coreg dose to 25 BID  Labetalol PRN  Continue monitoring BP, may need a second antihypertensive if BP remains high    # Type 2 DM  - on glipizide at home  - HbA1c unknown  Plan   Hold glimepiride for now.   Check BG ACHS  Sliding scale insulin    # Multiple myeloma  - Diagnosed in 2020  - follows up with oncologist and is on weekly chemotherapy (last dose on )      # Pancytopenia  - likely related to MM  Plan  Continue FeSo4        Code Status: Full  Surrogate Decision Maker:    DVT Prophylaxis: Lovenox  GI Prophylaxis: not indicated    Baseline: ambulatory      Subjective:   CHIEF COMPLAINT: Shortness of breath    HISTORY OF PRESENT ILLNESS:     Yuval Rosario is a 78 y.o.  female with PMH of significant for CAD and Multiple myeloma who presents with worsening of shortness of breath of one day duration. Patient states that she woke up wheezing and short of breath last night. She took albuterol inhaler twice last night and albuterol nebulizer this morning with some improvement. She denies chest pain, palpitaion, diaphoresis, fever. She has had worsening bilateral leg swelling over the last 6 months. Also has orhtopnea of 2 pillows. Denies self or family hx of CHF. Patient is on weekly chemotherapy for her MM (Cyclophosphamide, bortezomib, and high dose dexamethasone). Last dose was a week ago. She follows with hem/onc and her next appointment in on 9/22/20. Patient denies recent travel hx or contact with covid patient. We were asked to admit for work up and evaluation of the above problems. Past Medical History:   Diagnosis Date    Asthma     Diabetes ([facility] [address]     20+ years    Hypertension     Multiple myeloma ([facility] [address] 2020        Past Surgical History:   Procedure Laterality Date    BREAST SURGERY PROCEDURE UNLISTED      lumpectomy    HX HEENT      bilateral cataracts removed    HX OTHER SURGICAL      hemoroidectomy    UPPER GI ENDOSCOPY,BIOPSY  12/21/2016         UPPER GI ENDOSCOPY,REMV TUMOR,SNARE  12/21/2016            Social History     Tobacco Use    Smoking status: Never Smoker    Smokeless tobacco: Never Used   Substance Use Topics    Alcohol use: Not Currently     Comment: occasional        History reviewed. No pertinent family history. No Known Allergies     Prior to Admission medications    Medication Sig Start Date End Date Taking? Authorizing Provider   carvediloL (COREG) 12.5 mg tablet Take 12.5 mg by mouth two (2) times daily (with meals). Yes Provider, Historical   dexAMETHasone (DECADRON) 4 mg tablet Take 40 mg by mouth every seven (7) days.  Takes before chemo for MM   Yes Provider, Historical   acyclovir (ZOVIRAX) 400 mg tablet Take 400 mg by mouth two (2) times a day. Yes Provider, Historical   iron, carbonyl (Feosol) 45 mg tab Take 1 Tab by mouth daily. Yes Provider, Historical   glimepiride (AMARYL) 1 mg tablet Take 1 mg by mouth Daily (before breakfast). Yes Hali, MD Rizwana   ASPIRIN (ASPIR-81 PO) Take 81 mg by mouth. Hali, MD Rizwana       REVIEW OF SYSTEMS:     I am not able to complete the review of systems because:    The patient is intubated and sedated    The patient has altered mental status due to his acute medical problems    The patient has baseline aphasia from prior stroke(s)    The patient has baseline dementia and is not reliable historian    The patient is in acute medical distress and unable to provide information           Total of 12 systems reviewed as follows:       POSITIVE= underlined text  Negative = text not underlined  General:  fever, chills, sweats, generalized weakness, weight loss/gain,      loss of appetite   Eyes:    blurred vision, eye pain, loss of vision, double vision  ENT:    rhinorrhea, pharyngitis   Respiratory:   cough, sputum production, SOB, POLANCO, wheezing, pleuritic pain   Cardiology:   chest pain, palpitations, orthopnea, PND, edema, syncope   Gastrointestinal:  abdominal pain , N/V, diarrhea, dysphagia, constipation, bleeding   Genitourinary:  frequency, urgency, dysuria, hematuria, incontinence   Muskuloskeletal :  arthralgia, myalgia, back pain  Hematology:  easy bruising, nose or gum bleeding, lymphadenopathy   Dermatological: rash, ulceration, pruritis, color change / jaundice  Endocrine:   hot flashes or polydipsia   Neurological:  headache, dizziness, confusion, focal weakness, paresthesia,     Speech difficulties, memory loss, gait difficulty  Psychological: Feelings of anxiety, depression, agitation    Objective:   VITALS:    Visit Vitals  BP (!) 182/84   Pulse 85   Temp 98.2 °F (36.8 °C)   Resp 25   Ht 5' 5\" (1.651 m)   Wt 75.5 kg (166 lb 7.2 oz)   SpO2 97%   BMI 27.70 kg/m²       PHYSICAL EXAM:    General:    Alert, cooperative, no distress, appears stated age. HEENT: Atraumatic, anicteric sclerae, pink conjunctivae     No oral ulcers, mucosa moist, throat clear, dentition fair  Neck:  Supple, symmetrical,  thyroid: non tender  Lungs:   Crackles on both lower lung fields. No Wheezing or Rhonchi. Chest wall:  No tenderness  No Accessory muscle use. Heart:   Regular  rhythm, tachycardic, No  murmur   +3 pitting pretibial edema bilateraly  Abdomen:   Soft, non-tender. Not distended. Bowel sounds normal  Extremities: No cyanosis. No clubbing,      Skin turgor normal, Capillary refill normal, Radial dial pulse 2+  Skin:     Healed shingles rash on anterior chest, Not pale. Not Jaundiced  No rashes   Psych:  Good insight. Not depressed. Not anxious or agitated. Neurologic: EOMs intact. No facial asymmetry. No aphasia or slurred speech. Symmetrical strength, Sensation grossly intact.  Alert and oriented X 4.     _______________________________________________________________________  Care Plan discussed with:    Comments   Patient     Family      RN     Care Manager                    Consultant:      _______________________________________________________________________  Expected  Disposition:   Home with Family    HH/PT/OT/RN    SNF/LTC    [facility]    ________________________________________________________________________  TOTAL TIME:  28 Minutes    Critical Care Provided     Minutes non procedure based      Comments     Reviewed previous records   >50% of visit spent in counseling and coordination of care  Discussion with patient and/or family and questions answered       ________________________________________________________________________  Signed: Jose Antonio Beard MD    Procedures: see electronic medical records for all procedures/Xrays and details which were not copied into this note but were reviewed prior to creation of Plan.    LAB DATA REVIEWED:    Recent Results (from the past 24 hour(s))   CBC WITH AUTOMATED DIFF    Collection Time: 09/22/20  8:31 AM   Result Value Ref Range    WBC 3.5 (L) 3.6 - 11.0 K/uL    RBC 3.95 3.80 - 5.20 M/uL    HGB 10.3 (L) 11.5 - 16.0 g/dL    HCT 31.5 (L) 35.0 - 47.0 %    MCV 79.7 (L) 80.0 - 99.0 FL    MCH 26.1 26.0 - 34.0 PG    MCHC 32.7 30.0 - 36.5 g/dL    RDW 19.1 (H) 11.5 - 14.5 %    PLATELET 77 (L) 409 - 400 K/uL    MPV ABNORMAL 8.9 - 12.9 FL    NRBC 0.0 0  WBC    ABSOLUTE NRBC 0.00 0.00 - 0.01 K/uL    NEUTROPHILS 60 32 - 75 %    LYMPHOCYTES 18 12 - 49 %    MONOCYTES 21 (H) 5 - 13 %    EOSINOPHILS 1 0 - 7 %    BASOPHILS 0 0 - 1 %    IMMATURE GRANULOCYTES 0 0.0 - 0.5 %    ABS. NEUTROPHILS 2.2 1.8 - 8.0 K/UL    ABS. LYMPHOCYTES 0.6 (L) 0.8 - 3.5 K/UL    ABS. MONOCYTES 0.7 0.0 - 1.0 K/UL    ABS. EOSINOPHILS 0.0 0.0 - 0.4 K/UL    ABS. BASOPHILS 0.0 0.0 - 0.1 K/UL    ABS. IMM. GRANS. 0.0 0.00 - 0.04 K/UL    DF SMEAR SCANNED      RBC COMMENTS ANISOCYTOSIS  1+       METABOLIC PANEL, COMPREHENSIVE    Collection Time: 09/22/20  8:31 AM   Result Value Ref Range    Sodium 145 136 - 145 mmol/L    Potassium 3.6 3.5 - 5.1 mmol/L    Chloride 112 (H) 97 - 108 mmol/L    CO2 28 21 - 32 mmol/L    Anion gap 5 5 - 15 mmol/L    Glucose 145 (H) 65 - 100 mg/dL    BUN 9 6 - 20 MG/DL    Creatinine 0.80 0.55 - 1.02 MG/DL    BUN/Creatinine ratio 11 (L) 12 - 20      GFR est AA >60 >60 ml/min/1.73m2    GFR est non-AA >60 >60 ml/min/1.73m2    Calcium 7.9 (L) 8.5 - 10.1 MG/DL    Bilirubin, total 0.8 0.2 - 1.0 MG/DL    ALT (SGPT) 23 12 - 78 U/L    AST (SGOT) 22 15 - 37 U/L    Alk.  phosphatase 84 45 - 117 U/L    Protein, total 5.9 (L) 6.4 - 8.2 g/dL    Albumin 3.2 (L) 3.5 - 5.0 g/dL    Globulin 2.7 2.0 - 4.0 g/dL    A-G Ratio 1.2 1.1 - 2.2     CK W/ REFLX CKMB    Collection Time: 09/22/20  8:31 AM   Result Value Ref Range    CK 50 26 - 192 U/L   TROPONIN I    Collection Time: 09/22/20  8:31 AM   Result Value Ref Range Troponin-I, Qt. 0.11 (H) <0.05 ng/mL   NT-PRO BNP    Collection Time: 09/22/20  8:31 AM   Result Value Ref Range    NT pro-BNP 2,108 (H) <450 PG/ML   TROPONIN I    Collection Time: 09/22/20 10:36 AM   Result Value Ref Range    Troponin-I, Qt. 0.13 (H) <0.05 ng/mL

## 2020-09-22 NOTE — Clinical Note
TRANSFER - OUT REPORT:     Verbal report given to: [employer]. Report consisted of patient's Situation, Background, Assessment and   Recommendations(SBAR). Opportunity for questions and clarification was provided. Patient transported to: [facility].

## 2020-09-22 NOTE — CONSULTS
Consult    NAME: Scott Garner   :  1940   MRN:  [id number]     Date/Time:  2020 10:21 AM    Patient PCP: Jaun Triplett MD    Consult requested by: Dr Nitin Bailey  Primary cardiologist: Dr Thiago Rodriguez  ________________________________________________________________________     Assessment:     1. Acute on chronic decompensated heart failure with likely preserved EF   2. Indeterminate range trop elevation, type 2   3. H/O CAD, s/p >10 years ago, per patient stress test in  normal   4. Hypertension   5. Multiple myeloma on chemo   6. Diabetes         Plan:     1. Trop elevation is likely type 2, do not suspect ACS, continue to trend CE   2. Start lasix IV for diuresis, monitor I/O, daily weight and BMP   3. Check 2d echocardiogram   4. Cont coreg    5. CT for PE pending           [x]        High complexity decision making was performed        Subjective:     CHIEF COMPLAINT:  SOB    HISTORY OF PRESENT ILLNESS:     Bhargavi Cross is a 78 y.o. female with pmh of CAD, s/p PCI, multiple myeloma diagnosed recently on chemo, follows with Dr. Thiago Rodriguez (Cardiology) and per patient has stress test and echo in less than 1 year and was told everything was okay. Presented for gradually worsening of sob, orthopnea and PND. Much worse last night. Also reports weight gain and pedal edema. Was given lasix few months ago but only for a month and then did not have it. She also takes steroid with chemotherapy for MM. In ER has elevated BNP, trop minimal elevated at 0.11. EKG showed ST, LVH, TWI in lateral leads. Reports CP due to shingles but but no CP or chest pressure concerning for angina. We were asked to consult for work up and evaluation of the above problems.      Past Medical History:   Diagnosis Date    Asthma     Cancer ([facility] [address]     Diabetes ([facility] [address]       Past Surgical History:   Procedure Laterality Date    BREAST SURGERY PROCEDURE UNLISTED      lumpectomy    HX HEENT      bilateral cataracts removed    HX OTHER SURGICAL      hemoroidectomy    UPPER GI ENDOSCOPY,BIOPSY  12/21/2016         UPPER GI ENDOSCOPY,REMV TUMOR,SNARE  12/21/2016          No Known Allergies   Meds:  See below  Social History     Tobacco Use    Smoking status: Never Smoker    Smokeless tobacco: Never Used   Substance Use Topics    Alcohol use: Not Currently     Comment: occasional      FH:   Positive for heart disease     REVIEW OF SYSTEMS:         Total of 12 systems reviewed, all systems review was negative except Pertinent Positives included in HPI       Objective:      Physical Exam:    Last 24hrs VS reviewed since prior progress note. Most recent are:    Visit Vitals  BP (!) 178/89   Pulse 82   Temp 98.2 °F (36.8 °C)   Resp (!) 32   Ht 5' 5\" (1.651 m)   Wt 75.5 kg (166 lb 7.2 oz)   SpO2 94%   BMI 27.70 kg/m²       Intake/Output Summary (Last 24 hours) at 9/22/2020 1021  Last data filed at 9/22/2020 0919  Gross per 24 hour   Intake    Output 950 ml   Net -950 ml        Examination:     General: Alert + Oriented x3, no acute distress   HEENT: Normocephalic aromatic, MMM   Neck: Supple, JVP- not well appreciated   RS: Non labored, wheezing   CVS: Regular rate and rhythm, S1S2, no murmur   Abd: Soft, non tender, non distended   Lower extremity: Warm to touch, Edema-++  Skin: Warm and dry, No significant bruises or rash   CNS: Oriented x3, no focal neuro deficit           Data:      Telemetry:  SR    EKG:  ST, PAC, LVH, TWI in lateral leads     Cxr: Pulm edema     CT pending     Prior to Admission medications    Medication Sig Start Date End Date Taking? Authorizing Provider   esomeprazole (NEXIUM) 40 mg capsule Take 40 mg by mouth daily. Provider, Ryan   traMADol (ULTRAM) 50 mg tablet Take 1 Tab by mouth every six (6) hours as needed for Pain. Max Daily Amount: 200 mg. 12/14/16   LIDIA Bernard   atenolol (TENORMIN) 25 mg tablet Take 25 mg by mouth daily.     Other, MD Rizwana   glimepiride (AMARYL) 1 mg tablet Take 1 mg by mouth Daily (before breakfast). Rizwana Lal MD   ASPIRIN (ASPIR-81 PO) Take 81 mg by mouth. Rizwana Lal MD   lisinopril (PRINIVIL, ZESTRIL) 10 mg tablet Take  by mouth daily. Rizwana Lal MD       Recent Results (from the past 24 hour(s))   CBC WITH AUTOMATED DIFF    Collection Time: 09/22/20  8:31 AM   Result Value Ref Range    WBC 3.5 (L) 3.6 - 11.0 K/uL    RBC 3.95 3.80 - 5.20 M/uL    HGB 10.3 (L) 11.5 - 16.0 g/dL    HCT 31.5 (L) 35.0 - 47.0 %    MCV 79.7 (L) 80.0 - 99.0 FL    MCH 26.1 26.0 - 34.0 PG    MCHC 32.7 30.0 - 36.5 g/dL    RDW 19.1 (H) 11.5 - 14.5 %    PLATELET 77 (L) 743 - 400 K/uL    MPV ABNORMAL 8.9 - 12.9 FL    NRBC 0.0 0  WBC    ABSOLUTE NRBC 0.00 0.00 - 0.01 K/uL    NEUTROPHILS 60 32 - 75 %    LYMPHOCYTES 18 12 - 49 %    MONOCYTES 21 (H) 5 - 13 %    EOSINOPHILS 1 0 - 7 %    BASOPHILS 0 0 - 1 %    IMMATURE GRANULOCYTES 0 0.0 - 0.5 %    ABS. NEUTROPHILS 2.2 1.8 - 8.0 K/UL    ABS. LYMPHOCYTES 0.6 (L) 0.8 - 3.5 K/UL    ABS. MONOCYTES 0.7 0.0 - 1.0 K/UL    ABS. EOSINOPHILS 0.0 0.0 - 0.4 K/UL    ABS. BASOPHILS 0.0 0.0 - 0.1 K/UL    ABS. IMM. GRANS. 0.0 0.00 - 0.04 K/UL    DF SMEAR SCANNED      RBC COMMENTS ANISOCYTOSIS  1+       METABOLIC PANEL, COMPREHENSIVE    Collection Time: 09/22/20  8:31 AM   Result Value Ref Range    Sodium 145 136 - 145 mmol/L    Potassium 3.6 3.5 - 5.1 mmol/L    Chloride 112 (H) 97 - 108 mmol/L    CO2 28 21 - 32 mmol/L    Anion gap 5 5 - 15 mmol/L    Glucose 145 (H) 65 - 100 mg/dL    BUN 9 6 - 20 MG/DL    Creatinine 0.80 0.55 - 1.02 MG/DL    BUN/Creatinine ratio 11 (L) 12 - 20      GFR est AA >60 >60 ml/min/1.73m2    GFR est non-AA >60 >60 ml/min/1.73m2    Calcium 7.9 (L) 8.5 - 10.1 MG/DL    Bilirubin, total 0.8 0.2 - 1.0 MG/DL    ALT (SGPT) 23 12 - 78 U/L    AST (SGOT) 22 15 - 37 U/L    Alk.  phosphatase 84 45 - 117 U/L    Protein, total 5.9 (L) 6.4 - 8.2 g/dL    Albumin 3.2 (L) 3.5 - 5.0 g/dL    Globulin 2.7 2.0 - 4.0 g/dL    A-G Ratio 1.2 1.1 - 2.2     CK W/ REFLX CKMB    Collection Time: 09/22/20  8:31 AM   Result Value Ref Range    CK 50 26 - 192 U/L   TROPONIN I    Collection Time: 09/22/20  8:31 AM   Result Value Ref Range    Troponin-I, Qt. 0.11 (H) <0.05 ng/mL   NT-PRO BNP    Collection Time: 09/22/20  8:31 AM   Result Value Ref Range    NT pro-BNP 2,108 (H) <450 PG/ML        Yomi Rogel MD

## 2020-09-23 ENCOUNTER — APPOINTMENT (OUTPATIENT)
Dept: NON INVASIVE DIAGNOSTICS | Age: 80
DRG: 287 | End: 2020-09-23
Attending: STUDENT IN AN ORGANIZED HEALTH CARE EDUCATION/TRAINING PROGRAM
Payer: MEDICARE

## 2020-09-23 LAB
ALBUMIN SERPL-MCNC: 3.1 G/DL (ref 3.5–5)
ALBUMIN/GLOB SERPL: 1.1 {RATIO} (ref 1.1–2.2)
ALP SERPL-CCNC: 84 U/L (ref 45–117)
ALT SERPL-CCNC: 24 U/L (ref 12–78)
ANION GAP SERPL CALC-SCNC: 0 MMOL/L (ref 5–15)
AST SERPL-CCNC: 20 U/L (ref 15–37)
ATRIAL RATE: 78 BPM
BASOPHILS # BLD: 0 K/UL (ref 0–0.1)
BASOPHILS NFR BLD: 0 % (ref 0–1)
BILIRUB SERPL-MCNC: 1.2 MG/DL (ref 0.2–1)
BUN SERPL-MCNC: 8 MG/DL (ref 6–20)
BUN/CREAT SERPL: 10 (ref 12–20)
CALCIUM SERPL-MCNC: 7.7 MG/DL (ref 8.5–10.1)
CALCULATED P AXIS, ECG09: 40 DEGREES
CALCULATED R AXIS, ECG10: -15 DEGREES
CALCULATED T AXIS, ECG11: 46 DEGREES
CHLORIDE SERPL-SCNC: 109 MMOL/L (ref 97–108)
CO2 SERPL-SCNC: 35 MMOL/L (ref 21–32)
CREAT SERPL-MCNC: 0.78 MG/DL (ref 0.55–1.02)
DIAGNOSIS, 93000: NORMAL
DIFFERENTIAL METHOD BLD: ABNORMAL
ECHO AO ROOT DIAM: 3.1 CM
ECHO AV AREA PEAK VELOCITY: 2.1 CM2
ECHO AV AREA VTI: 2.5 CM2
ECHO AV AREA/BSA PEAK VELOCITY: 1.1 CM2/M2
ECHO AV AREA/BSA VTI: 1.4 CM2/M2
ECHO AV MEAN GRADIENT: 4.02 MMHG
ECHO AV MEAN VELOCITY: 94.5 CM/S
ECHO AV PEAK GRADIENT: 8.21 MMHG
ECHO AV PEAK VELOCITY: 143.3 CM/S
ECHO AV VTI: 23.38 CM
ECHO LA MAJOR AXIS: 3.69 CM
ECHO LA MINOR AXIS: 2.02 CM
ECHO LV E' LATERAL VELOCITY: 4.66 CM/S
ECHO LV E' SEPTAL VELOCITY: 3.75 CM/S
ECHO LV INTERNAL DIMENSION DIASTOLIC: 4.24 CM (ref 3.9–5.3)
ECHO LV INTERNAL DIMENSION SYSTOLIC: 3.38 CM
ECHO LV IVSD: 1.11 CM (ref 0.6–0.9)
ECHO LV MASS 2D: 187.4 G (ref 67–162)
ECHO LV MASS INDEX 2D: 102.6 G/M2 (ref 43–95)
ECHO LV POSTERIOR WALL DIASTOLIC: 1.35 CM (ref 0.6–0.9)
ECHO LVOT CARDIAC OUTPUT: 4.29 LITER/MINUTE
ECHO LVOT DIAM: 2.04 CM
ECHO LVOT PEAK GRADIENT: 3.38 MMHG
ECHO LVOT PEAK VELOCITY: 91.93 CM/S
ECHO LVOT SV: 58.5 ML
ECHO LVOT VTI: 17.92 CM
ECHO MV A VELOCITY: 100.9 CM/S
ECHO MV AREA PHT: 4.35 CM2
ECHO MV AREA VTI: 2.49 CM2
ECHO MV E DECELERATION TIME (DT): 0.19 S
ECHO MV E VELOCITY: 67.73 CM/S
ECHO MV E/A RATIO: 0.67
ECHO MV E/E' LATERAL: 14.53
ECHO MV E/E' RATIO (AVERAGED): 16.3
ECHO MV E/E' SEPTAL: 18.06
ECHO MV MAX VELOCITY: 113.33 CM/S
ECHO MV MEAN GRADIENT: 1.73 MMHG
ECHO MV PEAK GRADIENT: 5.14 MMHG
ECHO MV PRESSURE HALF TIME (PHT): 0.05 S
ECHO MV VTI: 23.46 CM
ECHO PV MAX VELOCITY: 146.68 CM/S
ECHO PV MEAN GRADIENT: 3.93 MMHG
ECHO PV PEAK INSTANTANEOUS GRADIENT SYSTOLIC: 8.61 MMHG
ECHO PV VTI: 27.7 CM
EOSINOPHIL # BLD: 0.1 K/UL (ref 0–0.4)
EOSINOPHIL NFR BLD: 2 % (ref 0–7)
ERYTHROCYTE [DISTWIDTH] IN BLOOD BY AUTOMATED COUNT: 18.7 % (ref 11.5–14.5)
GLOBULIN SER CALC-MCNC: 2.7 G/DL (ref 2–4)
GLUCOSE BLD STRIP.AUTO-MCNC: 101 MG/DL (ref 65–100)
GLUCOSE BLD STRIP.AUTO-MCNC: 102 MG/DL (ref 65–100)
GLUCOSE BLD STRIP.AUTO-MCNC: 143 MG/DL (ref 65–100)
GLUCOSE BLD STRIP.AUTO-MCNC: 323 MG/DL (ref 65–100)
GLUCOSE BLD STRIP.AUTO-MCNC: 323 MG/DL (ref 65–100)
GLUCOSE SERPL-MCNC: 83 MG/DL (ref 65–100)
HCT VFR BLD AUTO: 31.1 % (ref 35–47)
HGB BLD-MCNC: 10.1 G/DL (ref 11.5–16)
IMM GRANULOCYTES # BLD AUTO: 0 K/UL (ref 0–0.04)
IMM GRANULOCYTES NFR BLD AUTO: 0 % (ref 0–0.5)
LVOT MG: 1.43 MMHG
LYMPHOCYTES # BLD: 0.7 K/UL (ref 0.8–3.5)
LYMPHOCYTES NFR BLD: 25 % (ref 12–49)
MAGNESIUM SERPL-MCNC: 1.9 MG/DL (ref 1.6–2.4)
MCH RBC QN AUTO: 25.7 PG (ref 26–34)
MCHC RBC AUTO-ENTMCNC: 32.5 G/DL (ref 30–36.5)
MCV RBC AUTO: 79.1 FL (ref 80–99)
MONOCYTES # BLD: 0.7 K/UL (ref 0–1)
MONOCYTES NFR BLD: 28 % (ref 5–13)
NEUTS SEG # BLD: 1.1 K/UL (ref 1.8–8)
NEUTS SEG NFR BLD: 45 % (ref 32–75)
NRBC # BLD: 0 K/UL (ref 0–0.01)
NRBC BLD-RTO: 0 PER 100 WBC
P-R INTERVAL, ECG05: 140 MS
PHOSPHATE SERPL-MCNC: 2.3 MG/DL (ref 2.6–4.7)
PLATELET # BLD AUTO: 77 K/UL (ref 150–400)
POTASSIUM SERPL-SCNC: 3.4 MMOL/L (ref 3.5–5.1)
PROT SERPL-MCNC: 5.8 G/DL (ref 6.4–8.2)
Q-T INTERVAL, ECG07: 416 MS
QRS DURATION, ECG06: 90 MS
QTC CALCULATION (BEZET), ECG08: 474 MS
RBC # BLD AUTO: 3.93 M/UL (ref 3.8–5.2)
RBC MORPH BLD: ABNORMAL
SERVICE CMNT-IMP: ABNORMAL
SODIUM SERPL-SCNC: 144 MMOL/L (ref 136–145)
VENTRICULAR RATE, ECG03: 78 BPM
WBC # BLD AUTO: 2.6 K/UL (ref 3.6–11)

## 2020-09-23 PROCEDURE — 65660000000 HC RM CCU STEPDOWN

## 2020-09-23 PROCEDURE — 83735 ASSAY OF MAGNESIUM: CPT

## 2020-09-23 PROCEDURE — 74011250637 HC RX REV CODE- 250/637: Performed by: STUDENT IN AN ORGANIZED HEALTH CARE EDUCATION/TRAINING PROGRAM

## 2020-09-23 PROCEDURE — 93306 TTE W/DOPPLER COMPLETE: CPT

## 2020-09-23 PROCEDURE — 85025 COMPLETE CBC W/AUTO DIFF WBC: CPT

## 2020-09-23 PROCEDURE — 36415 COLL VENOUS BLD VENIPUNCTURE: CPT

## 2020-09-23 PROCEDURE — 82962 GLUCOSE BLOOD TEST: CPT

## 2020-09-23 PROCEDURE — 84100 ASSAY OF PHOSPHORUS: CPT

## 2020-09-23 PROCEDURE — 74011250637 HC RX REV CODE- 250/637: Performed by: FAMILY MEDICINE

## 2020-09-23 PROCEDURE — 74011250636 HC RX REV CODE- 250/636: Performed by: STUDENT IN AN ORGANIZED HEALTH CARE EDUCATION/TRAINING PROGRAM

## 2020-09-23 PROCEDURE — 74011636637 HC RX REV CODE- 636/637: Performed by: STUDENT IN AN ORGANIZED HEALTH CARE EDUCATION/TRAINING PROGRAM

## 2020-09-23 PROCEDURE — 80053 COMPREHEN METABOLIC PANEL: CPT

## 2020-09-23 RX ORDER — AMLODIPINE BESYLATE 5 MG/1
5 TABLET ORAL DAILY
Status: DISCONTINUED | OUTPATIENT
Start: 2020-09-23 | End: 2020-09-24

## 2020-09-23 RX ORDER — SPIRONOLACTONE 25 MG/1
12.5 TABLET ORAL DAILY
Status: DISCONTINUED | OUTPATIENT
Start: 2020-09-23 | End: 2020-09-25 | Stop reason: HOSPADM

## 2020-09-23 RX ORDER — ACYCLOVIR 200 MG/1
400 CAPSULE ORAL 2 TIMES DAILY
Status: DISCONTINUED | OUTPATIENT
Start: 2020-09-23 | End: 2020-09-25 | Stop reason: HOSPADM

## 2020-09-23 RX ADMIN — FERROUS SULFATE TAB 325 MG (65 MG ELEMENTAL FE) 325 MG: 325 (65 FE) TAB at 08:51

## 2020-09-23 RX ADMIN — INSULIN LISPRO 2 UNITS: 100 INJECTION, SOLUTION INTRAVENOUS; SUBCUTANEOUS at 16:30

## 2020-09-23 RX ADMIN — INSULIN LISPRO 7 UNITS: 100 INJECTION, SOLUTION INTRAVENOUS; SUBCUTANEOUS at 11:53

## 2020-09-23 RX ADMIN — AMLODIPINE BESYLATE 5 MG: 5 TABLET ORAL at 08:51

## 2020-09-23 RX ADMIN — FUROSEMIDE 40 MG: 10 INJECTION, SOLUTION INTRAMUSCULAR; INTRAVENOUS at 17:52

## 2020-09-23 RX ADMIN — FUROSEMIDE 40 MG: 10 INJECTION, SOLUTION INTRAMUSCULAR; INTRAVENOUS at 08:52

## 2020-09-23 RX ADMIN — ENOXAPARIN SODIUM 40 MG: 40 INJECTION SUBCUTANEOUS at 11:53

## 2020-09-23 RX ADMIN — CARVEDILOL 25 MG: 12.5 TABLET, FILM COATED ORAL at 17:51

## 2020-09-23 RX ADMIN — CARVEDILOL 25 MG: 12.5 TABLET, FILM COATED ORAL at 08:52

## 2020-09-23 RX ADMIN — SPIRONOLACTONE 12.5 MG: 25 TABLET ORAL at 09:01

## 2020-09-23 NOTE — PROGRESS NOTES
0700 shift change report given to Eagle (oncoming nurse) by Lalitha Juan (offgoing nurse). Report included the following information SBAR, Kardex, ED Summary and MAR.     1130 pt br low 96/46. attending made aware. Pt does not report symptoms of low bp. Attending at bedside. Pt bp retaken 123/53. Pt instructed to call before getting out of bed.

## 2020-09-23 NOTE — PROGRESS NOTES
Hospitalist Progress Note    NAME: Ander Gonzalez   :  1940   MRN:  [id number]       Assessment / Plan: 1. Acute exacerbation of CHF, ECHO pending   - newly diagnosed, no hx of CHF. Possibly related to bortezomib vs secondary to longstanding HTN. - presents with dyspnea, orthopnea, leg swelling  - pro-BNP   - CXR/CT shows pulmonary edema. CTA neg for PE. Plan  Diuress with IV lasix 40mg BID  Monitor I&O, electrolytes, daily weight measuring  Follow up echo result. Cardiology on board. appreciated their recommendations,           2. elevated trop  - likely demand ischemia from uncontrolled HTN, tachycardia  Plan  Recycle troponin.      3. Hypertension: BP low today, continue current regimen        4. Type 2 DM  - SSI, hyperglycemic        # Multiple myeloma  - Diagnosed in 2020  - follows up with oncologist and is on weekly chemotherapy (last dose on )        # Pancytopenia  - likely related to MM  Plan  Continue FeSo4           Code Status: Full  Surrogate Decision Maker:     DVT Prophylaxis: Lovenox  GI Prophylaxis: not indicated     Baseline: ambulatory     Subjective:     Chief Complaint / Reason for Physician Visit  \"feeling OK today, no dizziness, or shortness of breath \". Discussed with RN events overnight. Review of Systems:  Symptom Y/N Comments  Symptom Y/N Comments   Fever/Chills    Chest Pain     Poor Appetite    Edema     Cough    Abdominal Pain     Sputum    Joint Pain     SOB/POLANCO    Pruritis/Rash     Nausea/vomit    Tolerating PT/OT     Diarrhea    Tolerating Diet     Constipation    Other       Could NOT obtain due to:      Objective:     VITALS:   Last 24hrs VS reviewed since prior progress note.  Most recent are:  Patient Vitals for the past 24 hrs:   Temp Pulse Resp BP SpO2   20 1132 98 °F (36.7 °C) 81 18 (!) 96/46 94 %   20 0750 98 °F (36.7 °C) 82 20 (!) 160/71 92 %   20 0400  77      20 0326 97.8 °F (36.6 °C) 77 21 (!) 154/62 95 % 09/23/20 0000  78      09/22/20 2233 98.1 °F (36.7 °C) 78 22 (!) 162/70 98 %   09/22/20 2000  78 23 (!) 122/46 94 %   09/22/20 1900 98.1 °F (36.7 °C) 81 22 139/60 94 %   09/22/20 1807  79  (!) 171/70    09/22/20 1730  81 20 (!) 153/72 95 %   09/22/20 1700 98.2 °F (36.8 °C) 80 22 (!) 162/68 94 %   09/22/20 1630  74 19 (!) 147/104 93 %   09/22/20 1600  72 21 (!) 165/73 95 %   09/22/20 1530  74 19 (!) 164/71 96 %   09/22/20 1500  76 18 (!) 160/69 95 %   09/22/20 1400  71 22 (!) 145/65 94 %   09/22/20 1215  75 29 (!) 154/87 95 %       Intake/Output Summary (Last 24 hours) at 9/23/2020 1154  Last data filed at 9/23/2020 0545  Gross per 24 hour   Intake    Output 1650 ml   Net -1650 ml        PHYSICAL EXAM:  General: WD, WN. Alert, cooperative, no acute distress    EENT:  EOMI. Anicteric sclerae. MMM  Resp:  CTA bilaterally, no wheezing or rales. No accessory muscle use  CV:  Regular  rhythm,  2+ edema BLE   GI:  Soft, Non distend, Non tender.  +Bowel sounds  Neurologic:  Alert and oriented X 3, normal speech,   Psych:   Good insight. Not anxious nor agitated  Skin:  No rashes. No jaundice    Reviewed most current lab test results and cultures  YES  Reviewed most current radiology test results   YES  Review and summation of old records today    NO  Reviewed patient's current orders and MAR    YES  PMH/SH reviewed - no change compared to H&P  ________________________________________________________________________  Care Plan discussed with:    Comments   Patient     Family      RN     Care Manager     Consultant                        Multidiciplinary team rounds were held today with , nursing, pharmacist and clinical coordinator. Patient's plan of care was discussed; medications were reviewed and discharge planning was addressed.      ________________________________________________________________________  Total NON critical care TIME: 35  Minutes    Total CRITICAL CARE TIME Spent: Minutes non procedure based      Comments   >50% of visit spent in counseling and coordination of care     ________________________________________________________________________  Isa Howell MD     Procedures: see electronic medical records for all procedures/Xrays and details which were not copied into this note but were reviewed prior to creation of Plan. LABS:  I reviewed today's most current labs and imaging studies.   Pertinent labs include:  Recent Labs     09/23/20 0322 09/22/20  0831   WBC 2.6* 3.5*   HGB 10.1* 10.3*   HCT 31.1* 31.5*   PLT 77* 77*     Recent Labs     09/23/20 0322 09/22/20  0831    145   K 3.4* 3.6   * 112*   CO2 35* 28   GLU 83 145*   BUN 8 9   CREA 0.78 0.80   CA 7.7* 7.9*   MG 1.9  --    PHOS 2.3*  --    ALB 3.1* 3.2*   TBILI 1.2* 0.8   ALT 24 23       Signed: Isa Howell MD

## 2020-09-23 NOTE — PROGRESS NOTES
TRANSFER - IN REPORT:    Verbal report received from [facility] (name) on Walker Asper  being received from ED(unit) for routine progression of care      Report consisted of patients Situation, Background, Assessment and   Recommendations(SBAR). Information from the following report(s) SBAR, Kardex, ED Summary, Intake/Output, MAR, Recent Results, Med Rec Status and Cardiac Rhythm NSR was reviewed with the receiving nurse. Opportunity for questions and clarification was provided. Assessment completed upon patients arrival to unit and care assumed. 2230- Pt arrived to room via stretcher on monitor. Pt transferred into bed, VSS, no complaints of pain at this time. Primary Nurse Ronnell Quintanilla and Mendoza RN performed a dual skin assessment on this patient  Miguel score is     0300- Pt labs drawn. Pt resting comfortably, no complaints of pain, VSS.    0700- Bedside shift change report given to Alon/ANDREW Reese (oncoming nurse) by Nara Amezcua (offgoing nurse). Report included the following information SBAR, Kardex, ED Summary, OR Summary, Procedure Summary, Intake/Output, MAR, Accordion, Recent Results, Med Rec Status and Cardiac Rhythm NSR.

## 2020-09-23 NOTE — PROGRESS NOTES
RALF:    Assessment Needed    CM: Jeannie Tran attempted contact with pt's family: [place]ass [place], to complete pt assessment. However, attempt was unsuccessful, and CM left an voicemail requesting return call. CM will continue to follow.     Pt is Heart Failure Bundle Patient    Brian Merlos, WILIAN, [address] [place] [address]

## 2020-09-23 NOTE — PROGRESS NOTES
Progress Note      9/23/2020 8:13 AM  NAME: Yuval Rosario   MRN:  [id number]   Admit Diagnosis: CH[facility] (congestive heart failure) ([facility] [I50.9]    Consult requested by: Dr Skip Bey  Primary cardiologist: Dr Madina Domínguez  ________________________________________________________________________                 Assessment:       1. Acute on chronic decompensated heart failure with likely preserved EF   2. Indeterminate range trop elevation, type 2   3. H/O CAD, s/p >10 years ago, per patient stress test in [facility] normal   4. Hypertension   5. Multiple myeloma on chemo   6. Diabetes                      Plan:         trop remains in stable range > likely type 2   Cont lasix IV for diuresis   Adding spironolactone for high BP and low K   Cont coreg   Echo pending          [x]? High complexity decision making was performed         Subjective:     HPI:  Diuresed well with IV lasix, BP still high   No CP or sob. ROS: no n,v, abd pain     Objective:      Physical Exam:    Last 24hrs VS reviewed since prior progress note.  Most recent are:    Visit Vitals  BP (!) 160/71 (BP 1 Location: Right arm, BP Patient Position: At rest)   Pulse 82   Temp 98 °F (36.7 °C)   Resp 20   Ht 5' 5\" (1.651 m)   Wt 75.5 kg (166 lb 7.2 oz)   SpO2 92%   BMI 27.70 kg/m²       Intake/Output Summary (Last 24 hours) at 9/23/2020 0813  Last data filed at 9/23/2020 0545  Gross per 24 hour   Intake    Output 3800 ml   Net -3800 ml          General: Alert and oriented x3, no acute distress   Neck: Supple   Respiratory: No respiratory distress, clear lung sound   Cardiovascular: Regular rate rhythm, S1S2, no murmur   Abdomen: soft, non tender, non distended   Neuro: moves all extremities, oriented x3   Skin: warm and dry   Extremity: no edema, warm to touch        Data Review    Telemetry: normal sinus rhythm     EKG:        Lab Data Personally Reviewed:    Recent Labs     09/23/20  0322 09/22/20  0831   WBC 2.6* 3.5*   HGB 10.1* 10.3*   HCT 31.1* 31.5* PLT 77* 77*     No results for input(s): INR, PTP, APTT, INREXT in the last 72 hours. Recent Labs     09/23/20  0322 09/22/20  0831    145   K 3.4* 3.6   * 112*   CO2 35* 28   BUN 8 9   CREA 0.78 0.80   GLU 83 145*   CA 7.7* 7.9*   MG 1.9  --      Recent Labs     09/22/20  1820 09/22/20  1036 09/22/20  0831   TROIQ 0.12* 0.13* 0.11*     No results found for: CHOL, CHOLX, CHLST, CHOLV, HDL, HDLP, LDL, LDLC, DLDLP, TGLX, TRIGL, TRIGP, CHHD, CHHDX    Recent Labs     09/23/20 0322 09/22/20  0831   AP 84 84   TP 5.8* 5.9*   ALB 3.1* 3.2*   GLOB 2.7 2.7     No results for input(s): PH, PCO2, PO2 in the last 72 hours.     Medications Personally Reviewed:    Current Facility-Administered Medications   Medication Dose Route Frequency    amLODIPine (NORVASC) tablet 5 mg  5 mg Oral DAILY    spironolactone (ALDACTONE) tablet 12.5 mg  12.5 mg Oral DAILY    furosemide (LASIX) injection 40 mg  40 mg IntraVENous BID    acyclovir (ZOVIRAX) tablet 400 mg  400 mg Oral BID    ondansetron (ZOFRAN) injection 4 mg  4 mg IntraVENous Q6H PRN    acetaminophen (TYLENOL) tablet 650 mg  650 mg Oral Q6H PRN    docusate sodium (COLACE) capsule 100 mg  100 mg Oral PRN    enoxaparin (LOVENOX) injection 40 mg  40 mg SubCUTAneous Q24H    insulin lispro (HUMALOG) injection   SubCUTAneous AC&HS    glucose chewable tablet 16 g  4 Tab Oral PRN    dextrose (D50W) injection syrg 12.5-25 g  12.5-25 g IntraVENous PRN    glucagon (GLUCAGEN) injection 1 mg  1 mg IntraMUSCular PRN    labetaloL (NORMODYNE;TRANDATE) injection 20 mg  20 mg IntraVENous Q4H PRN    carvediloL (COREG) tablet 25 mg  25 mg Oral BID WITH MEALS    ferrous sulfate tablet 325 mg  1 Tab Oral DAILY WITH BREAKFAST              Evette Hernández MD

## 2020-09-23 NOTE — PROGRESS NOTES
TRANSFER - OUT REPORT:    Verbal report given to Stephanie Alexander RN(name) on [facility]  being transferred to PCU(unit) for routine progression of care       Report consisted of patients Situation, Background, Assessment and   Recommendations(SBAR). Information from the following report(s) SBAR, Kardex, Intake/Output, MAR, Recent Results and Cardiac Rhythm NSR was reviewed with the receiving nurse. Lines:   Peripheral IV 09/22/20 Anterior; Left Forearm (Active)   Site Assessment Clean, dry, & intact; Intact;Dry;Clean 09/22/20 0759   Phlebitis Assessment 0 09/22/20 0759   Infiltration Assessment 0 09/22/20 0759   Dressing Status Clean, dry, & intact; Clean;Dry; Intact 09/22/20 0759   Dressing Type Tape;Transparent 09/22/20 0759   Hub Color/Line Status Pink;Flushed 09/22/20 0759   Action Taken Blood drawn 09/22/20 0759   Alcohol Cap Used No 09/22/20 0759       Peripheral IV 09/22/20 Right Antecubital (Active)   Site Assessment Clean, dry, & intact 09/22/20 0833   Phlebitis Assessment 0 09/22/20 0833   Infiltration Assessment 0 09/22/20 0833   Dressing Status Clean, dry, & intact 09/22/20 0833   Dressing Type Tape;Transparent 09/22/20 0833   Hub Color/Line Status Pink;Flushed;Patent 09/22/20 3893   Action Taken Blood drawn 09/22/20 9192        Opportunity for questions and clarification was provided.       Patient transported with:   Monitor  O2 @ 2 liters  Registered Nurse

## 2020-09-24 LAB
ANION GAP SERPL CALC-SCNC: 3 MMOL/L (ref 5–15)
BASOPHILS # BLD: 0 K/UL (ref 0–0.1)
BASOPHILS NFR BLD: 0 % (ref 0–1)
BUN SERPL-MCNC: 12 MG/DL (ref 6–20)
BUN/CREAT SERPL: 14 (ref 12–20)
CALCIUM SERPL-MCNC: 8 MG/DL (ref 8.5–10.1)
CHLORIDE SERPL-SCNC: 106 MMOL/L (ref 97–108)
CO2 SERPL-SCNC: 34 MMOL/L (ref 21–32)
CREAT SERPL-MCNC: 0.88 MG/DL (ref 0.55–1.02)
DIFFERENTIAL METHOD BLD: ABNORMAL
EOSINOPHIL # BLD: 0.1 K/UL (ref 0–0.4)
EOSINOPHIL NFR BLD: 3 % (ref 0–7)
ERYTHROCYTE [DISTWIDTH] IN BLOOD BY AUTOMATED COUNT: 19 % (ref 11.5–14.5)
GLUCOSE BLD STRIP.AUTO-MCNC: 113 MG/DL (ref 65–100)
GLUCOSE BLD STRIP.AUTO-MCNC: 128 MG/DL (ref 65–100)
GLUCOSE BLD STRIP.AUTO-MCNC: 132 MG/DL (ref 65–100)
GLUCOSE BLD STRIP.AUTO-MCNC: 134 MG/DL (ref 65–100)
GLUCOSE BLD STRIP.AUTO-MCNC: 158 MG/DL (ref 65–100)
GLUCOSE SERPL-MCNC: 109 MG/DL (ref 65–100)
HCT VFR BLD AUTO: 30.7 % (ref 35–47)
HGB BLD-MCNC: 9.9 G/DL (ref 11.5–16)
IMM GRANULOCYTES # BLD AUTO: 0 K/UL (ref 0–0.04)
IMM GRANULOCYTES NFR BLD AUTO: 0 % (ref 0–0.5)
LYMPHOCYTES # BLD: 0.9 K/UL (ref 0.8–3.5)
LYMPHOCYTES NFR BLD: 27 % (ref 12–49)
MCH RBC QN AUTO: 25.4 PG (ref 26–34)
MCHC RBC AUTO-ENTMCNC: 32.2 G/DL (ref 30–36.5)
MCV RBC AUTO: 78.9 FL (ref 80–99)
MONOCYTES # BLD: 0.6 K/UL (ref 0–1)
MONOCYTES NFR BLD: 19 % (ref 5–13)
NEUTS SEG # BLD: 1.7 K/UL (ref 1.8–8)
NEUTS SEG NFR BLD: 51 % (ref 32–75)
NRBC # BLD: 0 K/UL (ref 0–0.01)
NRBC BLD-RTO: 0 PER 100 WBC
PLATELET # BLD AUTO: 86 K/UL (ref 150–400)
POTASSIUM SERPL-SCNC: 3.2 MMOL/L (ref 3.5–5.1)
RBC # BLD AUTO: 3.89 M/UL (ref 3.8–5.2)
RBC MORPH BLD: ABNORMAL
SERVICE CMNT-IMP: ABNORMAL
SODIUM SERPL-SCNC: 143 MMOL/L (ref 136–145)
WBC # BLD AUTO: 3.3 K/UL (ref 3.6–11)

## 2020-09-24 PROCEDURE — C1769 GUIDE WIRE: HCPCS | Performed by: STUDENT IN AN ORGANIZED HEALTH CARE EDUCATION/TRAINING PROGRAM

## 2020-09-24 PROCEDURE — 74011250636 HC RX REV CODE- 250/636: Performed by: STUDENT IN AN ORGANIZED HEALTH CARE EDUCATION/TRAINING PROGRAM

## 2020-09-24 PROCEDURE — 82962 GLUCOSE BLOOD TEST: CPT

## 2020-09-24 PROCEDURE — 65660000000 HC RM CCU STEPDOWN

## 2020-09-24 PROCEDURE — 74011000250 HC RX REV CODE- 250: Performed by: STUDENT IN AN ORGANIZED HEALTH CARE EDUCATION/TRAINING PROGRAM

## 2020-09-24 PROCEDURE — 77030019698 HC SYR ANGI MDLON MRTM -A: Performed by: STUDENT IN AN ORGANIZED HEALTH CARE EDUCATION/TRAINING PROGRAM

## 2020-09-24 PROCEDURE — 74011636637 HC RX REV CODE- 636/637: Performed by: STUDENT IN AN ORGANIZED HEALTH CARE EDUCATION/TRAINING PROGRAM

## 2020-09-24 PROCEDURE — C1894 INTRO/SHEATH, NON-LASER: HCPCS | Performed by: STUDENT IN AN ORGANIZED HEALTH CARE EDUCATION/TRAINING PROGRAM

## 2020-09-24 PROCEDURE — 93458 L HRT ARTERY/VENTRICLE ANGIO: CPT | Performed by: STUDENT IN AN ORGANIZED HEALTH CARE EDUCATION/TRAINING PROGRAM

## 2020-09-24 PROCEDURE — 80048 BASIC METABOLIC PNL TOTAL CA: CPT

## 2020-09-24 PROCEDURE — 99152 MOD SED SAME PHYS/QHP 5/>YRS: CPT | Performed by: STUDENT IN AN ORGANIZED HEALTH CARE EDUCATION/TRAINING PROGRAM

## 2020-09-24 PROCEDURE — 36415 COLL VENOUS BLD VENIPUNCTURE: CPT

## 2020-09-24 PROCEDURE — B2111ZZ FLUOROSCOPY OF MULTIPLE CORONARY ARTERIES USING LOW OSMOLAR CONTRAST: ICD-10-PCS | Performed by: STUDENT IN AN ORGANIZED HEALTH CARE EDUCATION/TRAINING PROGRAM

## 2020-09-24 PROCEDURE — 74011000636 HC RX REV CODE- 636: Performed by: STUDENT IN AN ORGANIZED HEALTH CARE EDUCATION/TRAINING PROGRAM

## 2020-09-24 PROCEDURE — 74011250637 HC RX REV CODE- 250/637: Performed by: STUDENT IN AN ORGANIZED HEALTH CARE EDUCATION/TRAINING PROGRAM

## 2020-09-24 PROCEDURE — 4A023N7 MEASUREMENT OF CARDIAC SAMPLING AND PRESSURE, LEFT HEART, PERCUTANEOUS APPROACH: ICD-10-PCS | Performed by: STUDENT IN AN ORGANIZED HEALTH CARE EDUCATION/TRAINING PROGRAM

## 2020-09-24 PROCEDURE — 85025 COMPLETE CBC W/AUTO DIFF WBC: CPT

## 2020-09-24 PROCEDURE — 77030004549 HC CATH ANGI DX PRF MRTM -A: Performed by: STUDENT IN AN ORGANIZED HEALTH CARE EDUCATION/TRAINING PROGRAM

## 2020-09-24 PROCEDURE — 74011250637 HC RX REV CODE- 250/637: Performed by: FAMILY MEDICINE

## 2020-09-24 PROCEDURE — 77030010221 HC SPLNT WR POS TELE -B: Performed by: STUDENT IN AN ORGANIZED HEALTH CARE EDUCATION/TRAINING PROGRAM

## 2020-09-24 PROCEDURE — 77030019569 HC BND COMPR RAD TERU -B: Performed by: STUDENT IN AN ORGANIZED HEALTH CARE EDUCATION/TRAINING PROGRAM

## 2020-09-24 PROCEDURE — 77030008543 HC TBNG MON PRSS MRTM -A: Performed by: STUDENT IN AN ORGANIZED HEALTH CARE EDUCATION/TRAINING PROGRAM

## 2020-09-24 PROCEDURE — 77030015766: Performed by: STUDENT IN AN ORGANIZED HEALTH CARE EDUCATION/TRAINING PROGRAM

## 2020-09-24 RX ORDER — SODIUM CHLORIDE 0.9 % (FLUSH) 0.9 %
5-40 SYRINGE (ML) INJECTION AS NEEDED
Status: DISCONTINUED | OUTPATIENT
Start: 2020-09-24 | End: 2020-09-25 | Stop reason: HOSPADM

## 2020-09-24 RX ORDER — HEPARIN SODIUM 1000 [USP'U]/ML
INJECTION, SOLUTION INTRAVENOUS; SUBCUTANEOUS AS NEEDED
Status: DISCONTINUED | OUTPATIENT
Start: 2020-09-24 | End: 2020-09-24 | Stop reason: HOSPADM

## 2020-09-24 RX ORDER — HEPARIN SODIUM 200 [USP'U]/100ML
INJECTION, SOLUTION INTRAVENOUS
Status: COMPLETED | OUTPATIENT
Start: 2020-09-24 | End: 2020-09-24

## 2020-09-24 RX ORDER — POTASSIUM CHLORIDE 20 MEQ/1
20 TABLET, EXTENDED RELEASE ORAL 2 TIMES DAILY
Status: DISCONTINUED | OUTPATIENT
Start: 2020-09-24 | End: 2020-09-25

## 2020-09-24 RX ORDER — SODIUM CHLORIDE 0.9 % (FLUSH) 0.9 %
5-40 SYRINGE (ML) INJECTION EVERY 8 HOURS
Status: DISCONTINUED | OUTPATIENT
Start: 2020-09-24 | End: 2020-09-25 | Stop reason: HOSPADM

## 2020-09-24 RX ORDER — FENTANYL CITRATE 50 UG/ML
INJECTION, SOLUTION INTRAMUSCULAR; INTRAVENOUS AS NEEDED
Status: DISCONTINUED | OUTPATIENT
Start: 2020-09-24 | End: 2020-09-24 | Stop reason: HOSPADM

## 2020-09-24 RX ORDER — LOSARTAN POTASSIUM 25 MG/1
25 TABLET ORAL DAILY
Status: DISCONTINUED | OUTPATIENT
Start: 2020-09-24 | End: 2020-09-25 | Stop reason: HOSPADM

## 2020-09-24 RX ORDER — LIDOCAINE HYDROCHLORIDE 10 MG/ML
INJECTION, SOLUTION EPIDURAL; INFILTRATION; INTRACAUDAL; PERINEURAL AS NEEDED
Status: DISCONTINUED | OUTPATIENT
Start: 2020-09-24 | End: 2020-09-24 | Stop reason: HOSPADM

## 2020-09-24 RX ORDER — VERAPAMIL HYDROCHLORIDE 2.5 MG/ML
INJECTION, SOLUTION INTRAVENOUS AS NEEDED
Status: DISCONTINUED | OUTPATIENT
Start: 2020-09-24 | End: 2020-09-24 | Stop reason: HOSPADM

## 2020-09-24 RX ORDER — MIDAZOLAM HYDROCHLORIDE 1 MG/ML
INJECTION, SOLUTION INTRAMUSCULAR; INTRAVENOUS AS NEEDED
Status: DISCONTINUED | OUTPATIENT
Start: 2020-09-24 | End: 2020-09-24 | Stop reason: HOSPADM

## 2020-09-24 RX ORDER — GUAIFENESIN 100 MG/5ML
81 LIQUID (ML) ORAL
Status: COMPLETED | OUTPATIENT
Start: 2020-09-24 | End: 2020-09-24

## 2020-09-24 RX ADMIN — INSULIN LISPRO 2 UNITS: 100 INJECTION, SOLUTION INTRAVENOUS; SUBCUTANEOUS at 16:54

## 2020-09-24 RX ADMIN — ASPIRIN 81 MG CHEWABLE TABLET 81 MG: 81 TABLET CHEWABLE at 10:54

## 2020-09-24 RX ADMIN — Medication 20 ML: at 22:00

## 2020-09-24 RX ADMIN — CARVEDILOL 25 MG: 12.5 TABLET, FILM COATED ORAL at 08:19

## 2020-09-24 RX ADMIN — CARVEDILOL 25 MG: 12.5 TABLET, FILM COATED ORAL at 16:57

## 2020-09-24 RX ADMIN — SPIRONOLACTONE 12.5 MG: 25 TABLET ORAL at 08:19

## 2020-09-24 RX ADMIN — FUROSEMIDE 40 MG: 10 INJECTION, SOLUTION INTRAMUSCULAR; INTRAVENOUS at 08:20

## 2020-09-24 RX ADMIN — POTASSIUM CHLORIDE 20 MEQ: 20 TABLET, EXTENDED RELEASE ORAL at 08:19

## 2020-09-24 RX ADMIN — POTASSIUM CHLORIDE 20 MEQ: 20 TABLET, EXTENDED RELEASE ORAL at 16:57

## 2020-09-24 RX ADMIN — FERROUS SULFATE TAB 325 MG (65 MG ELEMENTAL FE) 325 MG: 325 (65 FE) TAB at 08:19

## 2020-09-24 RX ADMIN — FUROSEMIDE 40 MG: 10 INJECTION, SOLUTION INTRAMUSCULAR; INTRAVENOUS at 16:55

## 2020-09-24 RX ADMIN — AMLODIPINE BESYLATE 5 MG: 5 TABLET ORAL at 08:19

## 2020-09-24 NOTE — PROGRESS NOTES
Brief Procedure Procedure:          Indication: CMP      Procedure: LHC, Cors       Complications: None   Blood loss: Minimal   Condition: Stable     Brief procedure Result:   No obstructive CAD     Recommendations:     HF medication optimization       Full note and recommendations to follow. '

## 2020-09-24 NOTE — PROGRESS NOTES
Problem: Falls - Risk of  Goal: *Absence of Falls  Description: Document Saul Lopez Fall Risk and appropriate interventions in the flowsheet.   Outcome: Progressing Towards Goal  Note: Fall Risk Interventions:  Mobility Interventions: Bed/chair exit alarm, Communicate number of staff needed for ambulation/transfer         Medication Interventions: Bed/chair exit alarm                   Problem: Patient Education: Go to Patient Education Activity  Goal: Patient/Family Education  Outcome: Progressing Towards Goal

## 2020-09-24 NOTE — PROGRESS NOTES
Progress Note      9/24/2020 8:13 AM  NAME: Tata Nava   MRN:  [id number]   Admit Diagnosis: CHF (congestive heart failure) ([facility] [I50.9]    Consult requested by: Dr Benito Davidson  Primary cardiologist: Dr Rayray Dougherty  ________________________________________________________________________                 Assessment:       1. Acute on chronic decompensated heart failure with new diagnosis of systolic heart failure EF 35-40%   2. Indeterminate range trop elevation, type 2   3. Reduced EF ? Related to chemo; will discuss with patient's oncologist Dr Santosh Kaur   4. Reviewed record from Dr Rosalee Forrester office, Echo in 2/2020 showed EF 55%, Echo in 7/2020 showed low normal EF 50-55%; Stress test in 2/2018 had 1-2 mm ST depression, fixed perfusion abnormality, normal EF   5. Hypertension   6. Multiple myeloma on chemo   7. Diabetes                      Plan:        Has reduced EF - has stress test equivocal with 1-2 mm ST depression - fixed apical defect probably attenuation - discussed with patient and family regarding ischemic with repeat nuclear stress test versus [facility] elected for [facility] for definitive eval of coronary artery disease   Called and obtain record from Dr Rosalee Forrester office     Cont lasix IV for diuresis for today, likely change to PO tomorrow   On coreg, change amlodipine to losartan for CMP  Cont spironolactone              [x]? High complexity decision making was performed         Subjective:     HPI:  On lasix, diuresing well   No CP or sob. ROS: no n,v, abd pain     Objective:      Physical Exam:    Last 24hrs VS reviewed since prior progress note.  Most recent are:    Visit Vitals  BP (!) 120/55   Pulse 76   Temp 98 °F (36.7 °C)   Resp 16   Ht 5' 5\" (1.651 m)   Wt 67.3 kg (148 lb 4.8 oz)   SpO2 96%   BMI 24.68 kg/m²       Intake/Output Summary (Last 24 hours) at 9/24/2020 1001  Last data filed at 9/24/2020 0931  Gross per 24 hour   Intake 840 ml   Output 2150 ml   Net -1310 ml          General: Alert and oriented x3, no acute distress   Neck: Supple   Respiratory: No respiratory distress, clear lung sound   Cardiovascular: Regular rate rhythm, S1S2, no murmur   Abdomen: soft, non tender, non distended   Neuro: moves all extremities, oriented x3   Skin: warm and dry   Extremity: no edema, warm to touch        Data Review    Telemetry: normal sinus rhythm     EKG:        Lab Data Personally Reviewed:    Recent Labs     09/24/20  0546 09/23/20  0322   WBC 3.3* 2.6*   HGB 9.9* 10.1*   HCT 30.7* 31.1*   PLT 86* 77*     No results for input(s): INR, PTP, APTT, INREXT, INREXT in the last 72 hours. Recent Labs     09/24/20  0546 09/23/20  0322 09/22/20  0831    144 145   K 3.2* 3.4* 3.6    109* 112*   CO2 34* 35* 28   BUN 12 8 9   CREA 0.88 0.78 0.80   * 83 145*   CA 8.0* 7.7* 7.9*   MG  --  1.9  --      Recent Labs     09/22/20  1820 09/22/20  1036 09/22/20  0831   TROIQ 0.12* 0.13* 0.11*     No results found for: CHOL, CHOLX, CHLST, CHOLV, HDL, HDLP, LDL, LDLC, DLDLP, TGLX, TRIGL, TRIGP, CHHD, CHHDX    Recent Labs     09/23/20 0322 09/22/20  0831   AP 84 84   TP 5.8* 5.9*   ALB 3.1* 3.2*   GLOB 2.7 2.7     No results for input(s): PH, PCO2, PO2 in the last 72 hours.     Medications Personally Reviewed:    Current Facility-Administered Medications   Medication Dose Route Frequency    potassium chloride (K-DUR, KLOR-CON) SR tablet 20 mEq  20 mEq Oral BID    losartan (COZAAR) tablet 25 mg  25 mg Oral DAILY    spironolactone (ALDACTONE) tablet 12.5 mg  12.5 mg Oral DAILY    acyclovir (ZOVIRAX) capsule 400 mg  400 mg Oral BID    furosemide (LASIX) injection 40 mg  40 mg IntraVENous BID    ondansetron (ZOFRAN) injection 4 mg  4 mg IntraVENous Q6H PRN    acetaminophen (TYLENOL) tablet 650 mg  650 mg Oral Q6H PRN    docusate sodium (COLACE) capsule 100 mg  100 mg Oral PRN    enoxaparin (LOVENOX) injection 40 mg  40 mg SubCUTAneous Q24H    insulin lispro (HUMALOG) injection   SubCUTAneous AC&HS  glucose chewable tablet 16 g  4 Tab Oral PRN    dextrose (D50W) injection syrg 12.5-25 g  12.5-25 g IntraVENous PRN    glucagon (GLUCAGEN) injection 1 mg  1 mg IntraMUSCular PRN    labetaloL (NORMODYNE;TRANDATE) injection 20 mg  20 mg IntraVENous Q4H PRN    carvediloL (COREG) tablet 25 mg  25 mg Oral BID WITH MEALS    ferrous sulfate tablet 325 mg  1 Tab Oral DAILY WITH Dave Grant MD

## 2020-09-24 NOTE — PROGRESS NOTES
Transition of Care Plan:    Acute exacerbation of CHF      D/C Home  F/U transportation   2nd IM Letter to be given at d/c  Family to provide transportation      Reason for Admission:                     RUR Score:     20%             PCP: First and Last name:  Dr. Kwame Noe   Name of Practice:    Are you a current patient: Yes/No:  Yes   Approximate date of last visit:  7/14/20   Can you participate in a virtual visit if needed:  yes    Do you (patient/family) have any concerns for transition/discharge? No              Plan for utilizing home health:  No    Current Advanced Directive/Advance Care Plan:     Full code, No ACD on file. Decline to complete at this time. Daughter/POC- [phone] (m). Transition of Care Plan:          D/C Home  F/U transportation   2nd IM Letter to be given at d/c  Family to provide transportation    CM verified with pt,pt demographics, insurance info and emergency contact. Pt is alert and oriented , answering questions appropriately. Pt lives alone in one story home , grandson lives with pt sometimes. Pt is independent with ADL's ,ambulates with a cane and family provides transportation. HH in the past- [facility] and No SNF. Uses [employer] delivery and [employer]. Care Management Interventions  PCP Verified by CM: Yes  Last Visit to PCP: 07/14/20  Mode of Transport at Discharge:  Other (see comment)(family to provide transportation )  Transition of Care Consult (CM Consult): Discharge Planning  Discharge Durable Medical Equipment: No(cane)  Current Support Network: Lives Alone, Family Lives Nearby  Confirm Follow Up Transport: [address]  Phone: [phone]

## 2020-09-24 NOTE — PROGRESS NOTES
Bedside and Verbal shift change report given to yung (oncoming nurse) by perry (offgoing nurse). Report included the following information SBAR and Kardex.

## 2020-09-24 NOTE — PROGRESS NOTES
Hospitalist Progress Note    NAME: Lisandro Ramsay   :  1940   MRN:  [id number]       Assessment / Plan: 1. Acute exacerbation of CHF, ECHO shows LVEF 35 to 40%, concentric hypertrophy, left ventricular diastolic dysfunction, mild left atrium dilated  - newly diagnosed, no hx of CHF. Possibly related to bortezomib vs secondary to longstanding HTN. - presents with dyspnea, orthopnea, leg swelling  - pro-BNP   - CXR/CT shows pulmonary edema. CTA neg for PE. Plan  Diuress with IV lasix 40mg BID  Monitor I&O, electrolytes, daily weight measuring  Follow up echo result. Cardiology on board. appreciated their recommendations,   · LV: Estimated LVEF is 35 - 40%. Normal cavity size. Mild concentric hypertrophy. Mild (grade 1) left ventricular diastolic dysfunction. · LA: Mildly dilated left atrium.            2. elevated trop  - likely demand ischemia from uncontrolled HTN, tachycardia  Plan  Recycle troponin. Status post coronary angiography, no obstructive coronary artery disease, optimization of congestive heart failure medication. Continue monitoring. Appreciate cardiology recommendations. 3.  Hypertension: BP low today, continue current regimen        4. Type 2 DM with hyperglycemia  - SSI, hyperglycemic        # Multiple myeloma  - Diagnosed in 2020  - follows up with oncologist and is on weekly chemotherapy (last dose on )        # Pancytopenia  - likely related to MM  Plan  Continue FeSo4           Code Status: Full  Surrogate Decision Maker:     DVT Prophylaxis: Lovenox  GI Prophylaxis: not indicated     Baseline: ambulatory  Plan of care discussed with daughter     Subjective:     Chief Complaint / Reason for Physician Visit   Discussed with RN events overnight.      Review of Systems:  Symptom Y/N Comments  Symptom Y/N Comments   Fever/Chills    Chest Pain     Poor Appetite    Edema     Cough    Abdominal Pain     Sputum    Joint Pain     SOB/POLANCO    Pruritis/Rash     Nausea/vomit Tolerating PT/OT     Diarrhea    Tolerating Diet     Constipation    Other       Could NOT obtain due to:      Objective:     VITALS:   Last 24hrs VS reviewed since prior progress note. Most recent are:  Patient Vitals for the past 24 hrs:   Temp Pulse Resp BP SpO2   09/24/20 1445  72  (!) 106/49 96 %   09/24/20 1432  73  115/61 93 %   09/24/20 1415  76  119/73 95 %   09/24/20 1402  72  (!) 143/58 96 %   09/24/20 1337 97.9 °F (36.6 °C) 70 18 134/63 92 %   09/24/20 1336  69  134/63    09/24/20 1200  73      09/24/20 1146 98.1 °F (36.7 °C) 62 16 (!) 118/55 98 %   09/24/20 0820    (!) 120/55    09/24/20 0800  76      09/24/20 0727 98 °F (36.7 °C) 75 16 137/77 96 %   09/24/20 0350 97.8 °F (36.6 °C) 79 15 (!) 141/67 95 %   09/23/20 2249 97.9 °F (36.6 °C) 85 17 132/61 95 %   09/23/20 2017 98.2 °F (36.8 °C) 78 16 (!) 101/42 94 %   09/23/20 1752    134/60    09/23/20 1600  73          Intake/Output Summary (Last 24 hours) at 9/24/2020 1530  Last data filed at 9/24/2020 0931  Gross per 24 hour   Intake 300 ml   Output 1600 ml   Net -1300 ml        PHYSICAL EXAM:  General: WD, WN. Alert, cooperative, no acute distress    EENT:  EOMI. Anicteric sclerae. MMM  Resp:  CTA bilaterally, no wheezing or rales. No accessory muscle use  CV:  Regular  rhythm,  2+ edema BLE   GI:  Soft, Non distend, Non tender.  +Bowel sounds  Neurologic:  Alert and oriented X 3, normal speech,   Psych:   Good insight. Not anxious nor agitated  Skin:  No rashes.   No jaundice    Reviewed most current lab test results and cultures  YES  Reviewed most current radiology test results   YES  Review and summation of old records today    NO  Reviewed patient's current orders and MAR    YES  PMH/SH reviewed - no change compared to H&P  ________________________________________________________________________  Care Plan discussed with:    Comments   Patient     Family      RN     Care Manager     Consultant Multidiciplinary team rounds were held today with , nursing, pharmacist and clinical coordinator. Patient's plan of care was discussed; medications were reviewed and discharge planning was addressed. ________________________________________________________________________  Total NON critical care TIME: 35  Minutes    Total CRITICAL CARE TIME Spent:   Minutes non procedure based      Comments   >50% of visit spent in counseling and coordination of care     ________________________________________________________________________  Lili Archuleta MD     Procedures: see electronic medical records for all procedures/Xrays and details which were not copied into this note but were reviewed prior to creation of Plan. LABS:  I reviewed today's most current labs and imaging studies.   Pertinent labs include:  Recent Labs     09/24/20 0546 09/23/20 0322 09/22/20  0831   WBC 3.3* 2.6* 3.5*   HGB 9.9* 10.1* 10.3*   HCT 30.7* 31.1* 31.5*   PLT 86* 77* 77*     Recent Labs     09/24/20 0546 09/23/20 0322 09/22/20  0831    144 145   K 3.2* 3.4* 3.6    109* 112*   CO2 34* 35* 28   * 83 145*   BUN 12 8 9   CREA 0.88 0.78 0.80   CA 8.0* 7.7* 7.9*   MG  --  1.9  --    PHOS  --  2.3*  --    ALB  --  3.1* 3.2*   TBILI  --  1.2* 0.8   ALT  --  24 23       Signed: Lili Archuleta MD

## 2020-09-24 NOTE — PROGRESS NOTES
0700 shift change report given to Gerry Regan and Izabella Montelongo (oncoming nurse) by nightshift (offgoing nurse). Report included the following information SBAR, Kardex and Intake/Output. Pt resting in bed with breakfast at bedside. Pt instructed to call when ready to get to chair. 0930 pt told to become NPO    1059 consent explained and signed. Baby Asprin given. Pt remains NPO.

## 2020-09-25 VITALS
RESPIRATION RATE: 17 BRPM | DIASTOLIC BLOOD PRESSURE: 96 MMHG | OXYGEN SATURATION: 95 % | HEART RATE: 82 BPM | SYSTOLIC BLOOD PRESSURE: 123 MMHG | WEIGHT: 145.4 LBS | BODY MASS INDEX: 24.22 KG/M2 | HEIGHT: 65 IN | TEMPERATURE: 98 F

## 2020-09-25 LAB
GLUCOSE BLD STRIP.AUTO-MCNC: 130 MG/DL (ref 65–100)
GLUCOSE BLD STRIP.AUTO-MCNC: 143 MG/DL (ref 65–100)
SERVICE CMNT-IMP: ABNORMAL
SERVICE CMNT-IMP: ABNORMAL

## 2020-09-25 PROCEDURE — 74011250636 HC RX REV CODE- 250/636: Performed by: STUDENT IN AN ORGANIZED HEALTH CARE EDUCATION/TRAINING PROGRAM

## 2020-09-25 PROCEDURE — 74011250637 HC RX REV CODE- 250/637: Performed by: FAMILY MEDICINE

## 2020-09-25 PROCEDURE — 74011636637 HC RX REV CODE- 636/637: Performed by: STUDENT IN AN ORGANIZED HEALTH CARE EDUCATION/TRAINING PROGRAM

## 2020-09-25 PROCEDURE — 74011250636 HC RX REV CODE- 250/636: Performed by: FAMILY MEDICINE

## 2020-09-25 PROCEDURE — 90471 IMMUNIZATION ADMIN: CPT

## 2020-09-25 PROCEDURE — 90686 IIV4 VACC NO PRSV 0.5 ML IM: CPT | Performed by: FAMILY MEDICINE

## 2020-09-25 PROCEDURE — 82962 GLUCOSE BLOOD TEST: CPT

## 2020-09-25 PROCEDURE — 74011250637 HC RX REV CODE- 250/637: Performed by: STUDENT IN AN ORGANIZED HEALTH CARE EDUCATION/TRAINING PROGRAM

## 2020-09-25 RX ORDER — POTASSIUM CHLORIDE 20 MEQ/1
20 TABLET, EXTENDED RELEASE ORAL DAILY
Status: DISCONTINUED | OUTPATIENT
Start: 2020-09-25 | End: 2020-09-25 | Stop reason: HOSPADM

## 2020-09-25 RX ORDER — DOCUSATE SODIUM 100 MG/1
100 CAPSULE, LIQUID FILLED ORAL AS NEEDED
Qty: 30 CAP | Refills: 0 | Status: SHIPPED | OUTPATIENT
Start: 2020-09-25 | End: 2020-10-25

## 2020-09-25 RX ORDER — LANOLIN ALCOHOL/MO/W.PET/CERES
325 CREAM (GRAM) TOPICAL
Qty: 30 TAB | Refills: 1 | Status: SHIPPED | OUTPATIENT
Start: 2020-09-25

## 2020-09-25 RX ORDER — FUROSEMIDE 40 MG/1
40 TABLET ORAL DAILY
Qty: 30 TAB | Refills: 1 | Status: SHIPPED | OUTPATIENT
Start: 2020-09-25 | End: 2020-12-19

## 2020-09-25 RX ORDER — LOSARTAN POTASSIUM 25 MG/1
25 TABLET ORAL DAILY
Qty: 30 TAB | Refills: 1 | Status: SHIPPED | OUTPATIENT
Start: 2020-09-25 | End: 2020-12-27

## 2020-09-25 RX ORDER — FUROSEMIDE 40 MG/1
40 TABLET ORAL DAILY
Status: DISCONTINUED | OUTPATIENT
Start: 2020-09-25 | End: 2020-09-25 | Stop reason: HOSPADM

## 2020-09-25 RX ORDER — CARVEDILOL 25 MG/1
25 TABLET ORAL 2 TIMES DAILY WITH MEALS
Qty: 60 TAB | Refills: 1 | Status: SHIPPED | OUTPATIENT
Start: 2020-09-25 | End: 2020-12-19

## 2020-09-25 RX ORDER — POTASSIUM CHLORIDE 20 MEQ/1
40 TABLET, EXTENDED RELEASE ORAL
Status: COMPLETED | OUTPATIENT
Start: 2020-09-25 | End: 2020-09-25

## 2020-09-25 RX ORDER — SPIRONOLACTONE 25 MG/1
12.5 TABLET ORAL DAILY
Qty: 30 TAB | Refills: 0 | Status: SHIPPED | OUTPATIENT
Start: 2020-09-26 | End: 2020-12-19

## 2020-09-25 RX ADMIN — POTASSIUM CHLORIDE 20 MEQ: 20 TABLET, EXTENDED RELEASE ORAL at 08:20

## 2020-09-25 RX ADMIN — FUROSEMIDE 40 MG: 40 TABLET ORAL at 08:20

## 2020-09-25 RX ADMIN — LOSARTAN POTASSIUM 25 MG: 25 TABLET, FILM COATED ORAL at 08:20

## 2020-09-25 RX ADMIN — POTASSIUM CHLORIDE 40 MEQ: 20 TABLET, EXTENDED RELEASE ORAL at 08:20

## 2020-09-25 RX ADMIN — FERROUS SULFATE TAB 325 MG (65 MG ELEMENTAL FE) 325 MG: 325 (65 FE) TAB at 08:19

## 2020-09-25 RX ADMIN — CARVEDILOL 25 MG: 12.5 TABLET, FILM COATED ORAL at 08:20

## 2020-09-25 RX ADMIN — ENOXAPARIN SODIUM 40 MG: 40 INJECTION SUBCUTANEOUS at 11:38

## 2020-09-25 RX ADMIN — ACYCLOVIR 400 MG: 200 CAPSULE ORAL at 08:20

## 2020-09-25 RX ADMIN — INFLUENZA VIRUS VACCINE 0.5 ML: 15; 15; 15; 15 SUSPENSION INTRAMUSCULAR at 12:48

## 2020-09-25 RX ADMIN — INSULIN LISPRO 2 UNITS: 100 INJECTION, SOLUTION INTRAVENOUS; SUBCUTANEOUS at 11:30

## 2020-09-25 RX ADMIN — SPIRONOLACTONE 12.5 MG: 25 TABLET ORAL at 08:20

## 2020-09-25 NOTE — PROGRESS NOTES
CM acknowledged discharge order    RALF  D/C Home  HH - [facility] will provide services. Family to provide transportation  Pt lives alone - family lives next door    Follow up appointments. CHF patient. Will need [facility] due to weekend discharge. BMP 5-7 days  Cardiology - s/p Cardiac Cath  Oncology per Cardiology  Note    1116am  [facility] does not cover patient's demographic area. 1124am  Patient's PCP first available appt is Oct 6th. Patient is agreeable to [facility] for SN and lab draw. FOC signed. [address] Referral submitted via [employer]. 1154am  [address] cannot accept due to lack of staffing. Multiple referrals submitted for HH to alternate agencies. [facility] [employer] does not provide service in this area. 1231pm  [facility] will provide [facility] services. CM tasks complete. Nursing informed.      Josue Sever, [place]  Ext [phone]

## 2020-09-25 NOTE — ROUTINE PROCESS
PCP RALF apt scheduled with Dr. Josef Lee on 10/6/20(earliest avail apt) at 3:30PM.  Apt added to AVS

## 2020-09-25 NOTE — ROUTINE PROCESS
Pt has had an uneventful night. Denies pain. Change of shift report given to Zee Valdez, [employer]. She will assume care of pt.

## 2020-09-25 NOTE — PROGRESS NOTES
Problem: Falls - Risk of  Goal: *Absence of Falls  Description: Document Kwadwo Heller Fall Risk and appropriate interventions in the flowsheet.   Outcome: Progressing Towards Goal  Note: Fall Risk Interventions:  Mobility Interventions: Patient to call before getting OOB, Utilize walker, cane, or other assistive device         Medication Interventions: Patient to call before getting OOB, Teach patient to arise slowly

## 2020-09-25 NOTE — DISCHARGE SUMMARY
Hospitalist Discharge Summary     Patient ID:  Yuval Rosario  [id number]  9 y.o.  1940  9/22/2020    PCP on record: Sanaz Tolliver MD    Admit date: 9/22/2020  Discharge date and time: 9/25/2020    DISCHARGE DIAGNOSIS:  Acute CHF exacerbation  Multiple myeloma    CONSULTATIONS:  IP CONSULT TO CARDIOLOGY    Excerpted HPI from H&P of Chadwick Agrawal MD:  HISTORY OF PRESENT ILLNESS:     Yuval Rosario is a 78 y.o.  female with PMH of significant for CAD and Multiple myeloma who presents with worsening of shortness of breath of one day duration. Patient states that she woke up wheezing and short of breath last night. She took albuterol inhaler twice last night and albuterol nebulizer this morning with some improvement. She denies chest pain, palpitaion, diaphoresis, fever. She has had worsening bilateral leg swelling over the last 6 months. Also has orhtopnea of 2 pillows. Denies self or family hx of CHF. Patient is on weekly chemotherapy for her MM (Cyclophosphamide, bortezomib, and high dose dexamethasone). Last dose was a week ago. She follows with hem/onc and her next appointment in on 9/22/20.     Patient denies recent travel hx or contact with covid patient.      We were asked to admit for work up and evaluation of the above problems.        ______________________________________________________________________  DISCHARGE SUMMARY/HOSPITAL COURSE:  for full details see H&P, daily progress notes, labs, consult notes. 1.Acute exacerbation of CHF, ECHO shows LVEF 35 to 40%, concentric hypertrophy, left ventricular diastolic dysfunction, mild left atrium dilated  - newly diagnosed, no hx of CHF. Possibly related to bortezomib vs secondary to longstanding HTN. - presents with dyspnea, orthopnea, leg swelling  - pro-BNP 2000  - CXR/CT shows pulmonary edema. CTA neg for PE. Plan  Diuress with IV lasix 40mg BID, discharged with p.o.  Lasix, follow-up with cardiology, patient on beta-blocker and ACE inhibitor, spironolactone  Monitor I&O, electrolytes, daily weight measuring  Follow up echo result. Cardiology on board. appreciated their recommendations,   · LV: Estimated LVEF is 35 - 40%. Normal cavity size. Mild concentric hypertrophy. Mild (grade 1) left ventricular diastolic dysfunction. · LA: Mildly dilated left atrium.              2. elevated trop  - likely demand ischemia from uncontrolled HTN, tachycardia  Plan  Recycle troponin. Status post coronary angiography, no obstructive coronary artery disease, optimization of congestive heart failure medication. Continue monitoring. Appreciate cardiology recommendations. 3.  Hypertension: BP low today, continue current regimen         4. Type 2 DM with hyperglycemia  - SSI, hyperglycemic         # Multiple myeloma  - Diagnosed in April 2020  - follows up with oncologist and is on weekly chemotherapy (last dose on 09/16)        # Pancytopenia  - likely related to MM  Plan  Continue FeSo4           Code Status: Full  Surrogate Decision Maker:     DVT Prophylaxis: Lovenox  GI Prophylaxis: not indicated     Baseline: ambulatory  Plan of care discussed with daughter        _______________________________________________________________________  Patient seen and examined by me on discharge day. Pertinent Findings:  Gen:    Not in distress  Chest: Clear lungs  CVS:   Regular rhythm. No edema  Abd:  Soft, not distended, not tender  Neuro:  Alert, oriented x3  _______________________________________________________________________  DISCHARGE MEDICATIONS:   Current Discharge Medication List      START taking these medications    Details   losartan (COZAAR) 25 mg tablet Take 1 Tab by mouth daily. Qty: 30 Tab, Refills: 1      furosemide (LASIX) 40 mg tablet Take 1 Tab by mouth daily. Qty: 30 Tab, Refills: 1      ferrous sulfate 325 mg (65 mg iron) tablet Take 1 Tab by mouth daily (with breakfast).   Qty: 30 Tab, Refills: 1 docusate sodium (COLACE) 100 mg capsule Take 1 Cap by mouth as needed for Constipation for up to 30 days. Qty: 30 Cap, Refills: 0      spironolactone (ALDACTONE) 25 mg tablet Take 0.5 Tabs by mouth daily. Qty: 30 Tab, Refills: 0         CONTINUE these medications which have CHANGED    Details   carvediloL (COREG) 25 mg tablet Take 1 Tab by mouth two (2) times daily (with meals). Qty: 60 Tab, Refills: 1         CONTINUE these medications which have NOT CHANGED    Details   dexAMETHasone (DECADRON) 4 mg tablet Take 40 mg by mouth every seven (7) days. Takes before chemo for MM      acyclovir (ZOVIRAX) 400 mg tablet Take 400 mg by mouth two (2) times a day. glimepiride (AMARYL) 1 mg tablet Take 1 mg by mouth Daily (before breakfast). ASPIRIN (ASPIR-81 PO) Take 81 mg by mouth. STOP taking these medications       iron, carbonyl (Feosol) 45 mg tab Comments:   Reason for Stopping:                 Patient Follow Up Instructions:    Activity: Activity as tolerated  Diet: Cardiac Diet  Wound Care: None needed    Follow-up with primary care, cardiology, oncology  Follow-up tests/labs BMP in 1 week  Follow-up Information    None       ________________________________________________________________    Risk of deterioration: Moderate    Condition at Discharge:  Stable  __________________________________________________________________    Disposition  Home with family, no needs    ____________________________________________________________________    Code Status: Full Code  ___________________________________________________________________      Total time in minutes spent coordinating this discharge (includes going over instructions, follow-up, prescriptions, and preparing report for sign off to her PCP) : 35 minutes    Signed:  Juanita Parkinson MD

## 2020-09-25 NOTE — PROGRESS NOTES
Progress Note      9/25/2020 8:13 AM  NAME: Chadwick Meza   MRN:  [id number]   Admit Diagnosis: CHF (congestive heart failure) ([facility] [address] [I50.9]    Consult requested by: Dr Asmita Mathew  Primary cardiologist: Dr Anh Goldstein  ________________________________________________________________________                 Assessment:       1. Acute on chronic decompensated heart failure with new diagnosis of systolic heart failure EF 35-40%   2. Indeterminate range trop elevation, type 2   3. LHC on 9/24/20 showed no obstructive CAD   4. Reduced EF possible Related to chemo; notified oncology office of Dr Alcon Quiroz, will follow up in 1-2 wks    5. Reviewed record from Dr Izabella Rios office, Echo in 2/2020 showed EF 55%, Echo in 7/2020 showed low normal EF 50-55%; Stress test in 2/2018 had 1-2 mm ST depression, fixed perfusion abnormality, normal EF   6. Hypertension   7. Multiple myeloma on chemo   8. Diabetes                      Plan:           Change  Lasix 40 mg IV BID to PO 40 mg daily   On coreg, changed amlodipine to losartan for CMP  Cont spironolactone   Need repeat echo   Follow up with cardiology and oncology clinic   Need Basic metabolic panel checked in 5-7 days              [x]? High complexity decision making was performed         Subjective:     HPI:     No CP or sob. Doing well     ROS: no n,v, abd pain     Objective:      Physical Exam:    Last 24hrs VS reviewed since prior progress note.  Most recent are:    Visit Vitals  /68   Pulse 88   Temp 98 °F (36.7 °C)   Resp 18   Ht 5' 5\" (1.651 m)   Wt 66 kg (145 lb 6.4 oz)   SpO2 96%   BMI 24.20 kg/m²       Intake/Output Summary (Last 24 hours) at 9/25/2020 6943  Last data filed at 9/24/2020 2354  Gross per 24 hour   Intake 500 ml   Output 1100 ml   Net -600 ml          General: Alert and oriented x3, no acute distress   Neck: Supple   Respiratory: No respiratory distress, clear lung sound   Cardiovascular: Regular rate rhythm, S1S2, no murmur   Abdomen: soft, non tender, non distended   Neuro: moves all extremities, oriented x3   Skin: warm and dry   Extremity: no edema, warm to touch        Data Review    Telemetry: normal sinus rhythm     EKG:        Lab Data Personally Reviewed:    Recent Labs     09/24/20  0546 09/23/20  0322   WBC 3.3* 2.6*   HGB 9.9* 10.1*   HCT 30.7* 31.1*   PLT 86* 77*     No results for input(s): INR, PTP, APTT, INREXT, INREXT in the last 72 hours. Recent Labs     09/24/20  0546 09/23/20  0322 09/22/20  0831    144 145   K 3.2* 3.4* 3.6    109* 112*   CO2 34* 35* 28   BUN 12 8 9   CREA 0.88 0.78 0.80   * 83 145*   CA 8.0* 7.7* 7.9*   MG  --  1.9  --      Recent Labs     09/22/20  1820 09/22/20  1036 09/22/20  0831   TROIQ 0.12* 0.13* 0.11*     No results found for: CHOL, CHOLX, CHLST, CHOLV, HDL, HDLP, LDL, LDLC, DLDLP, TGLX, TRIGL, TRIGP, CHHD, CHHDX    Recent Labs     09/23/20  0322 09/22/20  0831   AP 84 84   TP 5.8* 5.9*   ALB 3.1* 3.2*   GLOB 2.7 2.7     No results for input(s): PH, PCO2, PO2 in the last 72 hours.     Medications Personally Reviewed:    Current Facility-Administered Medications   Medication Dose Route Frequency    potassium chloride (K-DUR, KLOR-CON) SR tablet 40 mEq  40 mEq Oral NOW    potassium chloride (K-DUR, KLOR-CON) SR tablet 20 mEq  20 mEq Oral BID    losartan (COZAAR) tablet 25 mg  25 mg Oral DAILY    influenza vaccine 2020-21 (6 mos+)(PF) (FLUARIX/FLULAVAL/FLUZONE QUAD) injection 0.5 mL  0.5 mL IntraMUSCular PRIOR TO DISCHARGE    sodium chloride (NS) flush 5-40 mL  5-40 mL IntraVENous Q8H    sodium chloride (NS) flush 5-40 mL  5-40 mL IntraVENous PRN    spironolactone (ALDACTONE) tablet 12.5 mg  12.5 mg Oral DAILY    acyclovir (ZOVIRAX) capsule 400 mg  400 mg Oral BID    furosemide (LASIX) injection 40 mg  40 mg IntraVENous BID    ondansetron (ZOFRAN) injection 4 mg  4 mg IntraVENous Q6H PRN    acetaminophen (TYLENOL) tablet 650 mg  650 mg Oral Q6H PRN    docusate sodium (COLACE) capsule 100 mg  100 mg Oral PRN    enoxaparin (LOVENOX) injection 40 mg  40 mg SubCUTAneous Q24H    insulin lispro (HUMALOG) injection   SubCUTAneous AC&HS    glucose chewable tablet 16 g  4 Tab Oral PRN    dextrose (D50W) injection syrg 12.5-25 g  12.5-25 g IntraVENous PRN    glucagon (GLUCAGEN) injection 1 mg  1 mg IntraMUSCular PRN    labetaloL (NORMODYNE;TRANDATE) injection 20 mg  20 mg IntraVENous Q4H PRN    carvediloL (COREG) tablet 25 mg  25 mg Oral BID WITH MEALS    ferrous sulfate tablet 325 mg  1 Tab Oral DAILY WITH BREAKFAST              Екатерина Fitzgerald MD

## 2020-09-25 NOTE — PROGRESS NOTES
1048: cm at bedside    1236: cm states pt cleared to d/c. Pt requests to finish lunch before being d/c'd.     119: rn spoke to dr kvng grover, who states ok for pt to stop asa, as o/p hem/onc md d/c'd it w/ chemo. Change made manually on pt's copy of d/c instructions and pt made aware of confirmation of change. Discharge instructions reviewed with pt to his satisfaction. Signed copy on pt's ppr chart and original given to pt. New rxs sent to pt's pharmacy and pt made aware. Iv/tele removed. Pt d/c'd to home.

## 2020-09-28 ENCOUNTER — PATIENT OUTREACH (OUTPATIENT)
Dept: CASE MANAGEMENT | Age: 80
End: 2020-09-28

## 2020-09-28 NOTE — PROGRESS NOTES
[facility] -2020:  Acute CHF/Diabetes  Patient contacted regarding recent discharge and COVID-19 risk. Discussed COVID-19 related testing which was not done at this time. Test results were not done. Patient informed of results, if available? no    Care Transition Nurse/ Ambulatory Care Manager/ LPN Care Coordinator contacted the patient by telephone to perform post discharge assessment. Verified name and  with patient as identifiers. Patient has following risk factors of: heart failure. CTN/ACM/LPN reviewed discharge instructions, medical action plan and red flags related to discharge diagnosis. Reviewed and educated them on any new and changed medications related to discharge diagnosis. Advised obtaining a 90-day supply of all daily and as-needed medications. Advance Care Planning:   Does patient have an Advance Directive: currently not on file; education provided    Patient states daughter Alexis Ornelas remains Healthcare Decision Maker. Education provided regarding infection prevention, and signs and symptoms of COVID-19 and when to seek medical attention with patient who verbalized understanding. Discussed exposure protocols and quarantine from [address] you at higher risk for severe illness  and given an opportunity for questions and concerns. The patient agrees to contact the COVID-19 hotline [phone] or PCP office for questions related to their healthcare. CTN/ACM/LPN provided contact information for future reference. From CDC: Are you at higher risk for severe illness?  Wash your hands often.  Avoid close contact (6 feet, which is about two arm lengths) with people who are sick.  Put distance between yourself and other people if COVID-19 is spreading in your community.  Clean and disinfect frequently touched surfaces.  Avoid all cruise travel and non-essential air travel.    Call your healthcare professional if you have concerns about COVID-19 and your underlying condition or if you are sick. For more information on steps you can take to protect yourself, see CDC's How to Protect Yourself      Patient/family/caregiver given information for [employer] and agrees to enroll no    Plan for follow-up call in 7-14 days based on severity of symptoms and risk factors.

## 2020-09-28 NOTE — ACP (ADVANCE CARE PLANNING)
Advance Care Planning:   Does patient have an Advance Directive: currently not on file; education provided    Patient states daughter Sue Sin remains Healthcare Decision Maker.

## 2020-10-13 ENCOUNTER — PATIENT OUTREACH (OUTPATIENT)
Dept: CASE MANAGEMENT | Age: 80
End: 2020-10-13

## 2020-10-13 NOTE — PROGRESS NOTES
Patient resolved from Transition of Care episode on 10/13/2020  Discussed COVID-19 related testing which was not done at this time. Patient/family has been provided the following resources and education related to COVID-19:                         Signs, symptoms and red flags related to COVID-19            CDC exposure and quarantine guidelines            [employer] exposure contact - [phone]            Contact for their local Department of Health                 Patient currently reports that the following symptoms have improved:  no new symptoms and no worsening symptoms. No further outreach scheduled with this CTN/ACM/LPN/HC/ MA. Episode of Care resolved. Patient has this CTN/ACM/LPN/HC/MA contact information if future needs arise.

## 2020-12-19 RX ORDER — FUROSEMIDE 40 MG/1
TABLET ORAL
Qty: 30 TAB | Refills: 0 | Status: SHIPPED | OUTPATIENT
Start: 2020-12-19

## 2020-12-19 RX ORDER — CARVEDILOL 25 MG/1
TABLET ORAL
Qty: 60 TAB | Refills: 0 | Status: SHIPPED | OUTPATIENT
Start: 2020-12-19

## 2020-12-19 RX ORDER — SPIRONOLACTONE 25 MG/1
TABLET ORAL
Qty: 30 TAB | Refills: 0 | Status: SHIPPED | OUTPATIENT
Start: 2020-12-19

## 2022-06-12 ENCOUNTER — APPOINTMENT (OUTPATIENT)
Dept: GENERAL RADIOLOGY | Age: 82
End: 2022-06-12
Attending: EMERGENCY MEDICINE
Payer: MEDICARE

## 2022-06-12 ENCOUNTER — HOSPITAL ENCOUNTER (EMERGENCY)
Age: 82
Discharge: HOME | End: 2022-06-12
Attending: EMERGENCY MEDICINE
Payer: MEDICARE

## 2022-06-12 VITALS
DIASTOLIC BLOOD PRESSURE: 60 MMHG | HEART RATE: 61 BPM | RESPIRATION RATE: 16 BRPM | TEMPERATURE: 97.9 F | OXYGEN SATURATION: 100 % | HEIGHT: 65 IN | SYSTOLIC BLOOD PRESSURE: 161 MMHG | WEIGHT: 145 LBS | BODY MASS INDEX: 24.16 KG/M2

## 2022-06-12 DIAGNOSIS — M10.9 ACUTE GOUT OF LEFT KNEE, UNSPECIFIED CAUSE: Primary | ICD-10-CM

## 2022-06-12 PROCEDURE — 73562 X-RAY EXAM OF KNEE 3: CPT

## 2022-06-12 PROCEDURE — 74011250637 HC RX REV CODE- 250/637: Performed by: EMERGENCY MEDICINE

## 2022-06-12 PROCEDURE — 96372 THER/PROPH/DIAG INJ SC/IM: CPT

## 2022-06-12 PROCEDURE — 74011250636 HC RX REV CODE- 250/636: Performed by: EMERGENCY MEDICINE

## 2022-06-12 PROCEDURE — 99284 EMERGENCY DEPT VISIT MOD MDM: CPT

## 2022-06-12 PROCEDURE — 74011636637 HC RX REV CODE- 636/637: Performed by: EMERGENCY MEDICINE

## 2022-06-12 RX ORDER — PREDNISONE 10 MG/1
TABLET ORAL
Qty: 21 TABLET | Refills: 0 | Status: SHIPPED | OUTPATIENT
Start: 2022-06-12

## 2022-06-12 RX ORDER — KETOROLAC TROMETHAMINE 30 MG/ML
30 INJECTION, SOLUTION INTRAMUSCULAR; INTRAVENOUS
Status: COMPLETED | OUTPATIENT
Start: 2022-06-12 | End: 2022-06-12

## 2022-06-12 RX ORDER — IBUPROFEN 600 MG/1
600 TABLET ORAL
Qty: 20 TABLET | Refills: 0 | Status: SHIPPED | OUTPATIENT
Start: 2022-06-12

## 2022-06-12 RX ORDER — PREDNISONE 20 MG/1
40 TABLET ORAL
Status: COMPLETED | OUTPATIENT
Start: 2022-06-12 | End: 2022-06-12

## 2022-06-12 RX ORDER — HYDROCODONE BITARTRATE AND ACETAMINOPHEN 5; 325 MG/1; MG/1
1 TABLET ORAL
Qty: 12 TABLET | Refills: 0 | Status: SHIPPED | OUTPATIENT
Start: 2022-06-12 | End: 2022-06-19

## 2022-06-12 RX ORDER — HYDROCODONE BITARTRATE AND ACETAMINOPHEN 5; 325 MG/1; MG/1
1 TABLET ORAL
Status: COMPLETED | OUTPATIENT
Start: 2022-06-12 | End: 2022-06-12

## 2022-06-12 RX ORDER — COLCHICINE 0.6 MG/1
0.6 TABLET ORAL DAILY
Qty: 14 TABLET | Refills: 0 | Status: SHIPPED | OUTPATIENT
Start: 2022-06-12 | End: 2022-06-26

## 2022-06-12 RX ADMIN — KETOROLAC TROMETHAMINE 30 MG: 30 INJECTION, SOLUTION INTRAMUSCULAR at 12:12

## 2022-06-12 RX ADMIN — HYDROCODONE BITARTRATE AND ACETAMINOPHEN 1 TABLET: 5; 325 TABLET ORAL at 12:11

## 2022-06-12 RX ADMIN — PREDNISONE 40 MG: 20 TABLET ORAL at 12:11

## 2022-06-12 NOTE — DISCHARGE INSTRUCTIONS
Your examination and x-rays today are consistent with a flareup of gout causing your knee pain. I would recommend you take a prednisone Dosepak as prescribed, starting tomorrow. In addition, take ibuprofen 2 or 3 times a day as needed for pain. You can also take 1 tablet of Norco pain medication 2 or 3 times a day as needed for pain. If you need additional pain control, take colchicine 0.6 mg once a day, which can help reduce inflammation from gout. It can also cause significant stomach upset and diarrhea, and can be discontinued if you do not like the side effects. It was a pleasure taking care of you at [facility] Emergency Department today. We know that when you come to [facility], you are entrusting us with your health, comfort, and safety. Our physicians and nurses honor that trust, and we truly appreciate the opportunity to care for you and your loved ones. We also value your feedback. If you receive a survey about your Emergency Department experience today, please fill it out. We care about our patients' feedback, and we listen to what you have to say. Thank you!

## 2022-06-12 NOTE — ED PROVIDER NOTES
EMERGENCY DEPARTMENT HISTORY AND PHYSICAL EXAM           Date: 6/12/2022  Patient Name: Naida Jane  Patient Age and Sex: 78 y.o. female  MRN:  [id number]  CSN:  [id number]    History of Presenting Illness     Chief Complaint   Patient presents with    Leg Pain     pt wheelchair intotriage cc of left leg pain, worst in knee, but radiates up thigh and down to her foot. denies trauma, began yesterday, not taking any blood thinners. pt states knee is swollen, warm to touch       History Provided By: Patient and Patient's Son    Ability to gather history was limited by:     HPI: Naida Jane, 78 y.o. female with history of coronary artery disease, diabetes, gout, multiple myeloma, complains of pain in the left knee which is described as sharp and burning in nature, has been present for a few days, gradually worsening. Currently moderate to severe. No fevers. She is not having significant swelling to the knee. She feels that the knee is warm to the touch, and the pain radiates up and down throughout the leg. It is a native knee, no prior surgery or hardware. Location:    Quality:      Severity:    Duration:   Timing:      Context:    Modifying factors:   Associated symptoms:     Past History      The patient's medical, surgical, and social history on file were reviewed by me today.      The family history was reviewed by me today and was non-contributory, unless otherwise specified below:    Past Medical History:  Past Medical History:   Diagnosis Date    Asthma     Coronary artery disease     s/p stent    Diabetes ([facility] [address]     20+ years    Hypertension     Multiple myeloma ([facility] [address] 2020       Past Surgical History:  Past Surgical History:   Procedure Laterality Date    BREAST SURGERY PROCEDURE UNLISTED      lumpectomy    HX HEENT      bilateral cataracts removed    HX OTHER SURGICAL      hemoroidectomy    UPPER GI ENDOSCOPY,BIOPSY  12/21/2016         UPPER GI ENDOSCOPY,[facility] 12/21/2016            Family History:  No family history on file. Social History:  Social History     Tobacco Use    Smoking status: Never Smoker    Smokeless tobacco: Never Used   Substance Use Topics    Alcohol use: Not Currently     Comment: occasional    Drug use: No       Current Medications:  No current facility-administered medications on file prior to encounter. Current Outpatient Medications on File Prior to Encounter   Medication Sig Dispense Refill    losartan (COZAAR) 25 mg tablet Take 1 tablet by mouth once daily 7 Tab 0    spironolactone (ALDACTONE) 25 mg tablet Take 1/2 (one-half) tablet by mouth once daily 30 Tab 0    furosemide (LASIX) 40 mg tablet Take 1 tablet by mouth once daily 30 Tab 0    carvediloL (COREG) 25 mg tablet TAKE 1 TABLET BY MOUTH TWICE DAILY WITH MEALS 60 Tab 0    ferrous sulfate 325 mg (65 mg iron) tablet Take 1 Tab by mouth daily (with breakfast). 30 Tab 1    dexAMETHasone (DECADRON) 4 mg tablet Take 40 mg by mouth every seven (7) days. Takes before chemo for MM      acyclovir (ZOVIRAX) 400 mg tablet Take 400 mg by mouth two (2) times a day.  glimepiride (AMARYL) 1 mg tablet Take 1 mg by mouth Daily (before breakfast). Allergies:  No Known Allergies  Review of Systems    A complete ROS was reviewed by me today and was negative, unless otherwise specified below:    Review of Systems   Constitutional: Negative for fatigue and fever. Respiratory: Negative for shortness of breath. Musculoskeletal: Positive for arthralgias. Negative for joint swelling. Skin: Negative for wound. All other systems reviewed and are negative. Physical Exam   Vital Signs  Patient Vitals for the past 8 hrs:   Temp Pulse Resp BP SpO2   06/12/22 0934 97.9 °F (36.6 °C) 61 16 (!) 161/60 100 %          Physical Exam  Vitals and nursing note reviewed. Constitutional:       General: She is not in acute distress. Appearance: Normal appearance.  She is well-developed. She is not ill-appearing. HENT:      Head: Normocephalic and atraumatic. Cardiovascular:      Rate and Rhythm: Normal rate and regular rhythm. Heart sounds: Normal heart sounds. No murmur heard. Pulmonary:      Effort: Pulmonary effort is normal. No respiratory distress. Breath sounds: Normal breath sounds. No wheezing. Abdominal:      General: There is no distension. Palpations: Abdomen is soft. Tenderness: There is no abdominal tenderness. Musculoskeletal:         General: No deformity. Cervical back: Normal range of motion and neck supple. Left hip: Normal. No deformity or tenderness. Normal range of motion. Left knee: Erythema present. No swelling, deformity or effusion. Decreased range of motion. Tenderness present. Legs:       Comments: Mild tenderness, and subtle erythema on the lateral aspect of the knee. There is no knee effusion   Skin:     General: Skin is warm and dry. Findings: No rash. Neurological:      General: No focal deficit present. Mental Status: She is alert and oriented to person, place, and time. Sensory: Sensation is intact. Motor: Motor function is intact. Psychiatric:         Speech: Speech normal.         Behavior: Behavior normal.         Cognition and Memory: Cognition normal.         Diagnostic Study Results   Labs  No results found for this or any previous visit (from the past 24 hour(s)). Radiologic Studies  XR KNEE LT 3 V   Final Result   Medial compartment osteoarthritic changes without fracture or   dislocation.         CT Results  (Last 48 hours)    None        CXR Results  (Last 48 hours)    None          Billable Procedures   EKG reviewed by ED Physician in the absence of a cardiologist: Yes  EKG below was interpreted by Adalberto Michaels MD    Procedures    Medical Decision Making     I reviewed the patient's most recent Emergency Dept notes and diagnostic tests in formulating my MDM on today's visit. Provider Notes (Medical Decision Making):   78-year-old female with atraumatic pain in the left knee with mild erythema on the lateral aspect of the knee for the past few days. No fevers. History of gout. On examination she is afebrile, reassuring vital signs. Reassuring physical exam overall, no distress. There is no effusion of the knee. There is mild tenderness and some subtle erythema at the lateral aspect of the patella and knee. Clinical concern for septic arthritis is very low. Most likely this is gout or arthritis pain. X-ray consistent with gout. Recommend a course of prednisone, ibuprofen, colchicine, and Norco if needed. Would not advise antibiotics or diagnostic arthrocentesis at this time. Records reviewed:    Consults:  NIHSS:  HEART score:        Gilford Chuck, MD  2:32 PM  6/12/2022       Social History     Tobacco Use    Smoking status: Never Smoker    Smokeless tobacco: Never Used   Substance Use Topics    Alcohol use: Not Currently     Comment: occasional    Drug use: No       Medications Administered during ED course:  Medications   predniSONE (DELTASONE) tablet 40 mg (40 mg Oral Given 6/12/22 1211)   ketorolac (TORADOL) injection 30 mg (30 mg IntraMUSCular Given 6/12/22 1212)   HYDROcodone-acetaminophen (NORCO) 5-325 mg per tablet 1 Tablet (1 Tablet Oral Given 6/12/22 1211)          Prescriptions from today's ED visit:  Discharge Medication List as of 6/12/2022 12:30 PM      START taking these medications    Details   predniSONE (STERAPRED DS) 10 mg dose pack As directed on packaging, Normal, Disp-21 Tablet, R-0      ibuprofen (MOTRIN) 600 mg tablet Take 1 Tablet by mouth every six (6) hours as needed for Pain., Normal, Disp-20 Tablet, R-0      colchicine 0.6 mg tablet Take 1 Tablet by mouth daily for 14 days. , Normal, Disp-14 Tablet, R-0      HYDROcodone-acetaminophen (Norco) 5-325 mg per tablet Take 1 Tablet by mouth every six (6) hours as needed for Pain for up to 7 days. Max Daily Amount: 4 Tablets., Normal, Disp-12 Tablet, R-0         CONTINUE these medications which have NOT CHANGED    Details   losartan (COZAAR) 25 mg tablet Take 1 tablet by mouth once daily, Normal, Disp-7 Tab, R-0      spironolactone (ALDACTONE) 25 mg tablet Take 1/2 (one-half) tablet by mouth once daily, Normal, Disp-30 Tab, R-0      furosemide (LASIX) 40 mg tablet Take 1 tablet by mouth once daily, Normal, Disp-30 Tab, R-0      carvediloL (COREG) 25 mg tablet TAKE 1 TABLET BY MOUTH TWICE DAILY WITH MEALS, Normal, Disp-60 Tab, R-0      ferrous sulfate 325 mg (65 mg iron) tablet Take 1 Tab by mouth daily (with breakfast). , Normal, Disp-30 Tab,R-1      dexAMETHasone (DECADRON) 4 mg tablet Take 40 mg by mouth every seven (7) days. Takes before chemo for MM, Historical Med      acyclovir (ZOVIRAX) 400 mg tablet Take 400 mg by mouth two (2) times a day., Historical Med      glimepiride (AMARYL) 1 mg tablet Take 1 mg by mouth Daily (before breakfast). , Historical Med            Diagnosis and Disposition     Disposition:  Discharged    Clinical Impression:   1. Acute gout of left knee, unspecified cause        Attestation:  I personally performed the services described in this documentation on this date 6/12/2022 for patient Florencia Childs Radha Morales MD        I was the first provider for this patient on this visit. To the best of my ability I reviewed relevant prior medical records, electrocardiograms, laboratories, and radiologic studies. The patient's presenting problems were discussed, and the patient was in agreement with the care plan formulated and outlined with them. Radha Morales MD    Please note that this dictation was completed with Dragon voice recognition software. Quite often unanticipated grammatical, syntax, homophones, and other interpretive errors are inadvertently transcribed by the computer software.    Please disregard these errors and excuse any errors that have escaped final proofreading.

## (undated) DEVICE — SYRINGE ANGIO 10 CC BRL STD PRNT POLYCARB LT BLU MEDALLION

## (undated) DEVICE — TR BAND RADIAL ARTERY COMPRESSION DEVICE: Brand: TR BAND

## (undated) DEVICE — GUIDEWIRE VASC L260CM 0.035IN J TIP L3MM PTFE FIX COR NAMIC

## (undated) DEVICE — TUBING PRSS MON L6IN PVC M FEM CONN

## (undated) DEVICE — 3M™ TEGADERM™ TRANSPARENT FILM DRESSING FRAME STYLE, 1626W, 4 IN X 4-3/4 IN (10 CM X 12 CM), 50/CT 4CT/CASE: Brand: 3M™ TEGADERM™

## (undated) DEVICE — GLIDESHEATH SLENDER ACCESS KIT: Brand: GLIDESHEATH SLENDER

## (undated) DEVICE — SPLINT WR POS F/ARTERIAL ACC -- BX/10

## (undated) DEVICE — CATHETER ETER ANGIO L110CM OD5FR ID046IN L75CM 038IN 145DEG CARD

## (undated) DEVICE — ANGIOGRAPHY KIT CUST [K0910930B] [MERIT MEDICAL SYSTEMS INC]

## (undated) DEVICE — HI-TORQUE VERSACORE FLOPPY GUIDE WIRE SYSTEM 145 CM: Brand: HI-TORQUE VERSACORE

## (undated) DEVICE — PACK PROCEDURE SURG HRT CATH

## (undated) DEVICE — RADIFOCUS OPTITORQUE ANGIOGRAPHIC CATHETER: Brand: OPTITORQUE